# Patient Record
Sex: MALE | Race: WHITE | HISPANIC OR LATINO | Employment: STUDENT | URBAN - METROPOLITAN AREA
[De-identification: names, ages, dates, MRNs, and addresses within clinical notes are randomized per-mention and may not be internally consistent; named-entity substitution may affect disease eponyms.]

---
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
## 2019-01-25 ENCOUNTER — OFFICE VISIT (OUTPATIENT)
Dept: OBGYN CLINIC | Facility: CLINIC | Age: 19
End: 2019-01-25
Payer: COMMERCIAL

## 2019-01-25 VITALS
HEIGHT: 72 IN | BODY MASS INDEX: 30.53 KG/M2 | DIASTOLIC BLOOD PRESSURE: 76 MMHG | SYSTOLIC BLOOD PRESSURE: 124 MMHG | HEART RATE: 69 BPM | WEIGHT: 225.4 LBS

## 2019-01-25 DIAGNOSIS — Z98.890 STATUS POST LABRAL REPAIR OF SHOULDER: Primary | ICD-10-CM

## 2019-01-25 PROCEDURE — 99213 OFFICE O/P EST LOW 20 MIN: CPT | Performed by: ORTHOPAEDIC SURGERY

## 2019-01-25 NOTE — PROGRESS NOTES
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 [employer]  He is also cleared to return to gym at this time  He will continue home exercises for the shoulder and follow up as needed  Subjective:   Kristen Villalobos is a 22 y o  male who presents today for follow-up of his left shoulder, now about 5 months status post posterior labral repair  He is doing well and notes only some mild soreness about the shoulder when leaning on it or doing certain activities  He has been doing a fair amount of lifting with the shoulder, though he feels he cannot bench press heavy weight yet  He notes good range of motion of the shoulder and denies any instability  He was doing physical therapy up until about a week ago, and notes significant improvement with that  Review of Systems   Constitutional: Negative for chills, fever and unexpected weight change  HENT: Negative for hearing loss, nosebleeds and sore throat  Eyes: Negative for pain, redness and visual disturbance  Respiratory: Negative for cough, shortness of breath and wheezing  Cardiovascular: Negative for chest pain, palpitations and leg swelling  Gastrointestinal: Negative for abdominal pain, nausea and vomiting  Endocrine: Negative for polyphagia and polyuria  Genitourinary: Negative for dysuria and hematuria  Musculoskeletal:        See HPI   Skin: Negative for rash and wound  Neurological: Negative for dizziness, numbness and headaches  Psychiatric/Behavioral: Negative for decreased concentration and suicidal ideas   The patient is not nervous/anxious  Past Medical History:   Diagnosis Date    Anesthesia complication     Sister had difficulty waking up    Fracture     of radius and ulna-reduced with out surgery-still bothers him    Shoulder pain, left     Wears glasses        Past Surgical History:   Procedure Laterality Date    NO PAST SURGERIES      [place] SHLDR ARTHROSCOP,SURG,CAPSULORRHAPHY Left 8/23/2018    Procedure: SHOULDER ARTHROSCOPY POSTERIOR LABRAL REPAIR;  Surgeon: Parish Phoenix MD;  Location: [facility] OR;  Service: Orthopedics       Family History   Problem Relation Age of Onset    No Known Problems Mother     No Known Problems Father     No Known Problems Sister     No Known Problems Brother     No Known Problems Maternal Aunt     No Known Problems Maternal Uncle     No Known Problems Paternal Aunt     No Known Problems Paternal Uncle     No Known Problems Maternal Grandmother     No Known Problems Maternal Grandfather     No Known Problems Paternal Grandmother     No Known Problems Paternal Grandfather     ADD / ADHD Neg Hx     Anesthesia problems Neg Hx     Cancer Neg Hx     Clotting disorder Neg Hx     Collagen disease Neg Hx     Diabetes Neg Hx     Dislocations Neg Hx     Learning disabilities Neg Hx     Neurological problems Neg Hx     Osteoporosis Neg Hx     Rheumatologic disease Neg Hx     Scoliosis Neg Hx     Vascular Disease Neg Hx        Social History     Occupational History    Not on file       Social History Main Topics    Smoking status: Current Some Day Smoker     Types: E-Cigarettes    Smokeless tobacco: Never Used      Comment: social smoker    Alcohol use No    Drug use: No    Sexual activity: Yes     Partners: Female         Current Outpatient Prescriptions:     Melatonin 5 MG CAPS, Take 5 mg by mouth as needed, Disp: , Rfl:     No Known Allergies    Objective:  Vitals:    01/25/19 1436   BP: 124/76   Pulse: 69       Left Shoulder Exam     Tenderness   The patient is experiencing no tenderness  Range of Motion   Active Abduction: normal   Forward Flexion: normal   External Rotation: normal   Internal Rotation 0 degrees: normal     Muscle Strength   Abduction: 5/5   Internal Rotation: 5/5   External Rotation: 5/5     Tests   Apprehension: negative (Negative anterior and posterior)  Drop Arm: negative  Hawkin's test: negative  Impingement: negative    Other   Erythema: absent  Scars: present  Sensation: normal  Pulse: present             Physical Exam   Constitutional: He is oriented to person, place, and time  He appears well-developed  HENT:   Head: Normocephalic and atraumatic  Eyes: Conjunctivae are normal    Neck: Neck supple  Cardiovascular: Intact distal pulses  Pulmonary/Chest: Effort normal  No respiratory distress  Neurological: He is alert and oriented to person, place, and time  Skin: Skin is warm and dry  Psychiatric: He has a normal mood and affect  His behavior is normal    Vitals reviewed

## 2019-01-25 NOTE — LETTER
January 25, 2019     Patient: Devon Madera   YOB: 2000   Date of Visit: 1/25/2019       To Whom it May Concern:    Kalli Rao is under my professional care  He was seen in my office on 1/25/2019  He has recovered fully from his left shoulder surgery and is cleared for all [employer] duty  If you have any questions or concerns, please don't hesitate to call           Sincerely,          Keara Grayson MD        CC: No Recipients

## 2019-01-25 NOTE — LETTER
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

## 2019-01-25 NOTE — LETTER
January 25, 2019     Patient: Henrique Blevins   YOB: 2000   Date of Visit: 1/25/2019       To Whom it May Concern:    Raudel Jama is under my professional care  He was seen in my office on 1/25/2019  Please excuse him from school today  He is cleared for gym and sports at this time  If you have any questions or concerns, please don't hesitate to call           Sincerely,          Basilio Mcnair MD        CC: No Recipients

## 2019-03-29 ENCOUNTER — APPOINTMENT (OUTPATIENT)
Dept: RADIOLOGY | Facility: CLINIC | Age: 19
End: 2019-03-29
Payer: COMMERCIAL

## 2019-03-29 ENCOUNTER — OFFICE VISIT (OUTPATIENT)
Dept: OBGYN CLINIC | Facility: CLINIC | Age: 19
End: 2019-03-29
Payer: COMMERCIAL

## 2019-03-29 VITALS
HEIGHT: 72 IN | HEART RATE: 56 BPM | DIASTOLIC BLOOD PRESSURE: 63 MMHG | SYSTOLIC BLOOD PRESSURE: 128 MMHG | BODY MASS INDEX: 30.15 KG/M2 | WEIGHT: 222.6 LBS

## 2019-03-29 DIAGNOSIS — S43.402A SPRAIN OF LEFT SHOULDER, UNSPECIFIED SHOULDER SPRAIN TYPE, INITIAL ENCOUNTER: Primary | ICD-10-CM

## 2019-03-29 DIAGNOSIS — M25.512 ACUTE PAIN OF LEFT SHOULDER: ICD-10-CM

## 2019-03-29 DIAGNOSIS — S29.011A STRAIN OF LEFT PECTORALIS MUSCLE, INITIAL ENCOUNTER: ICD-10-CM

## 2019-03-29 PROCEDURE — 73030 X-RAY EXAM OF SHOULDER: CPT

## 2019-03-29 PROCEDURE — 99214 OFFICE O/P EST MOD 30 MIN: CPT | Performed by: ORTHOPAEDIC SURGERY

## 2019-03-29 NOTE — PROGRESS NOTES
Assessment/Plan:  1  Sprain of left shoulder, unspecified shoulder sprain type, initial encounter  Ambulatory referral to Physical Therapy   2  Acute pain of left shoulder  XR shoulder 2+ vw left   3  Strain of left pectoralis muscle, initial encounter  Ambulatory referral to Physical Therapy       Scribe Attestation    I,:   Patrick Alonzost am acting as a scribe while in the presence of the attending physician :        I,:   Abi Selby MD personally performed the services described in this documentation    as scribed in my presence :              Shaila Escobedo upon examination and review of x-rays today of the left shoulder demonstrates signs and symptoms consistent with a left pectoralis muscular strain as well as a left shoulder sprain  He does demonstrate full range of motion reasonable strength with rotator cuff testing  His examination is not consistent with a recurrent injury to the posterior labrum  However I did note to him that there is a possibility that he has an injury to the superior labrum with a positive apprehension test   I would like to treat him conservatively prior to undergoing an MRI with physical therapy  I provided him a prescription physical therapy today to be completed 2 to 3 times a week over the next 6 weeks  I do feel that he may return to work however I provided with a note dictating that he is to be on light duty over the next 2 weeks  Additionally provided him a note for school today  I would like to see John Humphrey back in 6 weeks for repeat clinical evaluation should he fail to see improvements at that time we may consider an MRI of the left shoulder to question internal derangement  Subjective:   Radha Allen is a 22 y o  male who presents to the office today for his left shoulder  He is 7 months status post posterior labral repair  He states that he suffered a fall while in gym class on Wednesday and braced his weight with his left upper extremity    He states that he felt a pop at the time of fall  He notes that his pain is an intermittent and mild to moderate sharp pain about the superior posterior aspect of his shoulder  He states that his pain is exacerbated with reaching up behind his head  He notes that his range of motion is overall intact however is uncomfortable with overhead activities  This pain is better while at rest   He denies experiencing any paresthesias the time of injury, and he denies any distal paresthesias today  Review of Systems   Constitutional: Negative for chills, fever and unexpected weight change  HENT: Negative for hearing loss, nosebleeds and sore throat  Eyes: Negative for pain, redness and visual disturbance  Respiratory: Negative for cough, shortness of breath and wheezing  Cardiovascular: Negative for chest pain, palpitations and leg swelling  Gastrointestinal: Negative for abdominal pain, nausea and vomiting  Endocrine: Negative for polyphagia and polyuria  Genitourinary: Negative for dysuria and hematuria  Musculoskeletal: Positive for arthralgias and myalgias  See HPI   Skin: Negative for rash and wound  Neurological: Negative for dizziness, numbness and headaches  Psychiatric/Behavioral: Negative for decreased concentration and suicidal ideas  The patient is not nervous/anxious            Past Medical History:   Diagnosis Date    Anesthesia complication     Sister had difficulty waking up    Fracture     of radius and ulna-reduced with out surgery-still bothers him    Shoulder pain, left     Wears glasses        Past Surgical History:   Procedure Laterality Date    NO PAST SURGERIES      [place] SHLDR ARTHROSCOP,SURG,CAPSULORRHAPHY Left 8/23/2018    Procedure: SHOULDER ARTHROSCOPY POSTERIOR LABRAL REPAIR;  Surgeon: Lian Morton MD;  Location: [facility];  Service: Orthopedics       Family History   Problem Relation Age of Onset    No Known Problems Mother     No Known Problems Father     No Known Problems Sister     No Known Problems Brother     No Known Problems Maternal Aunt     No Known Problems Maternal Uncle     No Known Problems Paternal Aunt     No Known Problems Paternal Uncle     No Known Problems Maternal Grandmother     No Known Problems Maternal Grandfather     No Known Problems Paternal Grandmother     No Known Problems Paternal Grandfather     ADD / ADHD Neg Hx     Anesthesia problems Neg Hx     Cancer Neg Hx     Clotting disorder Neg Hx     Collagen disease Neg Hx     Diabetes Neg Hx     Dislocations Neg Hx     Learning disabilities Neg Hx     Neurological problems Neg Hx     Osteoporosis Neg Hx     Rheumatologic disease Neg Hx     Scoliosis Neg Hx     Vascular Disease Neg Hx        Social History     Occupational History    Not on file   Tobacco Use    Smoking status: Current Some Day Smoker     Types: E-Cigarettes    Smokeless tobacco: Never Used    Tobacco comment: social smoker   Substance and Sexual Activity    Alcohol use: No    Drug use: No    Sexual activity: Yes     Partners: Female         Current Outpatient Medications:     Melatonin 5 MG CAPS, Take 5 mg by mouth as needed, Disp: , Rfl:     No Known Allergies    Objective:  Vitals:    03/29/19 0759   BP: 128/63   Pulse: 56       Left Shoulder Exam     Tenderness   Left shoulder tenderness location: Posterior aspect of the shoulder, upper trapezius, levator scapula, pectoralis musculature      Range of Motion   Active abduction: 160   Passive abduction: 160   External rotation: 60   Forward flexion: 170   Internal rotation 0 degrees: L2     Muscle Strength   Abduction: 5/5   Internal rotation: 5/5   External rotation: 5/5   Biceps: 4/5     Other   Erythema: absent  Scars: present  Sensation: normal  Pulse: present     Comments:  Empty can test:  Negative  Helenville's test:  Positive  Anterior apprehension test:  Positive  Posterior apprehension test:  Negative  Scottie Lupton:  Negative  Neer's impingement:  Negative  Speed's test:  Positive  Pectoralis tendon is palpable on exam             Physical Exam   Constitutional: He is oriented to person, place, and time  He appears well-developed and well-nourished  HENT:   Head: Normocephalic and atraumatic  Eyes: Conjunctivae are normal  Right eye exhibits no discharge  Left eye exhibits no discharge  Neck: Normal range of motion  Neck supple  Cardiovascular: Normal rate and intact distal pulses  Pulmonary/Chest: Effort normal  No respiratory distress  Musculoskeletal:   As noted in HPI   Neurological: He is alert and oriented to person, place, and time  Skin: Skin is warm and dry  Psychiatric: He has a normal mood and affect  His behavior is normal  Judgment and thought content normal    Nursing note and vitals reviewed  I have personally reviewed pertinent films in PACS and my interpretation is as follows:    X-ray of the left shoulder demonstrates no acute fracture or other osseous abnormalities  There is evidence of the anchor tracts from previous posterior labral repair  The humerus is well aligned to the glenoid

## 2019-03-29 NOTE — LETTER
March 29, 2019     Patient: lAden Mcpherson   YOB: 2000   Date of Visit: 3/29/2019       To Whom it May Concern:    Omega Raleigh is under my professional care  He was seen in my office on 3/29/2019  He may return to school on 3/29/2019  May return to work at [employer] duty over the next 2 weeks       If you have any questions or concerns, please don't hesitate to call           Sincerely,          Ramonita Aguilar MD        CC: No Recipients

## 2019-03-29 NOTE — PROGRESS NOTES
Assessment/Plan:  1   Acute pain of left shoulder  XR shoulder 2+ vw left       Scribe Attestation    I,:    am acting as a scribe while in the presence of the attending physician :        I,:    personally performed the services described in this documentation    as scribed in my presence :              ***    Subjective:   Katelynn Ornelas is a 22 y o  male who presents to the office today for ***      Review of Systems      Past Medical History:   Diagnosis Date    Anesthesia complication     Sister had difficulty waking up    Fracture     of radius and ulna-reduced with out surgery-still bothers him    Shoulder pain, left     Wears glasses        Past Surgical History:   Procedure Laterality Date    NO PAST SURGERIES      [place] SHLDR ARTHROSCOP,SURG,CAPSULORRHAPHY Left 8/23/2018    Procedure: SHOULDER ARTHROSCOPY POSTERIOR LABRAL REPAIR;  Surgeon: Reymundo Byrne MD;  Location: [facility];  Service: Orthopedics       Family History   Problem Relation Age of Onset    No Known Problems Mother     No Known Problems Father     No Known Problems Sister     No Known Problems Brother     No Known Problems Maternal Aunt     No Known Problems Maternal Uncle     No Known Problems Paternal Aunt     No Known Problems Paternal Uncle     No Known Problems Maternal Grandmother     No Known Problems Maternal Grandfather     No Known Problems Paternal Grandmother     No Known Problems Paternal Grandfather     ADD / ADHD Neg Hx     Anesthesia problems Neg Hx     Cancer Neg Hx     Clotting disorder Neg Hx     Collagen disease Neg Hx     Diabetes Neg Hx     Dislocations Neg Hx     Learning disabilities Neg Hx     Neurological problems Neg Hx     Osteoporosis Neg Hx     Rheumatologic disease Neg Hx     Scoliosis Neg Hx     Vascular Disease Neg Hx        Social History     Occupational History    Not on file   Tobacco Use    Smoking status: Current Some Day Smoker     Types: E-Cigarettes    Smokeless tobacco: Never Used    Tobacco comment: social smoker   Substance and Sexual Activity    Alcohol use: No    Drug use: No    Sexual activity: Yes     Partners: Female         Current Outpatient Medications:     Melatonin 5 MG CAPS, Take 5 mg by mouth as needed, Disp: , Rfl:     No Known Allergies    Objective:  Vitals:    03/29/19 0759   BP: 128/63   Pulse: 56       Ortho Exam    Physical Exam    I have personally reviewed pertinent films in PACS and my interpretation is as follows:  ***

## 2019-04-25 ENCOUNTER — OFFICE VISIT (OUTPATIENT)
Dept: OBGYN CLINIC | Facility: CLINIC | Age: 19
End: 2019-04-25
Payer: COMMERCIAL

## 2019-04-25 VITALS
BODY MASS INDEX: 29.69 KG/M2 | DIASTOLIC BLOOD PRESSURE: 75 MMHG | HEART RATE: 61 BPM | WEIGHT: 219.2 LBS | HEIGHT: 72 IN | SYSTOLIC BLOOD PRESSURE: 109 MMHG

## 2019-04-25 DIAGNOSIS — S29.011A STRAIN OF LEFT PECTORALIS MUSCLE, INITIAL ENCOUNTER: Primary | ICD-10-CM

## 2019-04-25 PROCEDURE — 99213 OFFICE O/P EST LOW 20 MIN: CPT | Performed by: PHYSICIAN ASSISTANT

## 2020-02-07 ENCOUNTER — OFFICE VISIT (OUTPATIENT)
Dept: OBGYN CLINIC | Facility: CLINIC | Age: 20
End: 2020-02-07
Payer: COMMERCIAL

## 2020-02-07 VITALS
HEIGHT: 72 IN | DIASTOLIC BLOOD PRESSURE: 79 MMHG | BODY MASS INDEX: 29.55 KG/M2 | WEIGHT: 218.2 LBS | SYSTOLIC BLOOD PRESSURE: 112 MMHG | HEART RATE: 75 BPM

## 2020-02-07 DIAGNOSIS — S43.432D LABRAL TEAR OF SHOULDER, LEFT, SUBSEQUENT ENCOUNTER: Primary | ICD-10-CM

## 2020-02-07 DIAGNOSIS — Z98.890 STATUS POST LABRAL REPAIR OF SHOULDER: ICD-10-CM

## 2020-02-07 PROCEDURE — 99214 OFFICE O/P EST MOD 30 MIN: CPT | Performed by: ORTHOPAEDIC SURGERY

## 2020-02-07 RX ORDER — ACETAMINOPHEN 325 MG/1
650 TABLET ORAL EVERY 6 HOURS PRN
COMMUNITY

## 2020-02-07 NOTE — PROGRESS NOTES
Assessment/Plan:  1  Labral tear of shoulder, left, subsequent encounter  MRI arthrogram left shoulder    FL injection left shoulder (arthrogram)   2  Status post labral repair of shoulder  MRI arthrogram left shoulder    FL injection left shoulder (arthrogram)       Scribe Attestation    I,:   Mckenzie Georges MA am acting as a scribe while in the presence of the attending physician :        I,:   Paulina Henry MD personally performed the services described in this documentation    as scribed in my presence :            Trey and I engaged in conversation in regards to his posterior shoulder pain  I am concerned because he continues to have pain while doing push-ups that he potentially has a recurrent posterior labral tear  I did order a left shoulder MRI arthrogram for further evaluation of this  Patient will return after MRI to review the imaging and discuss treatment options  Subjective:   Ene Robbins is a 20 y o  male who presents to the office today for evaluation of his left shoulder  Patient was last seen in April 2019 where he had sustained a pectoralis strain  Patient notes today that he does not have anterior shoulder pain but posterior pain  Does have a past medical history of a posterior labral repair in August of 2018  Patient notes no new incident of injury  He did try to join the [employer] recently and was not able forearm pushups without posterior shoulder pain and fatigue  Patient also notes significant pain while lifting objects overhead  His pain is activity related in nature  He denies any numbness and tingling  He has done physical therapy in the past in regards to his pec strain  Review of Systems   Constitutional: Negative for chills, fever and unexpected weight change  HENT: Negative for hearing loss, nosebleeds and sore throat  Eyes: Negative for pain, redness and visual disturbance  Respiratory: Negative for cough, shortness of breath and wheezing  Cardiovascular: Negative for chest pain, palpitations and leg swelling  Gastrointestinal: Negative for abdominal pain, nausea and vomiting  Endocrine: Negative for polydipsia and polyuria  Genitourinary: Negative for dysuria and hematuria  Musculoskeletal: Positive for arthralgias and myalgias  Negative for joint swelling  Skin: Negative for rash and wound  Neurological: Negative for dizziness, numbness and headaches  Psychiatric/Behavioral: Negative for agitation, decreased concentration and suicidal ideas           Past Medical History:   Diagnosis Date    Anesthesia complication     Sister had difficulty waking up    Fracture     of radius and ulna-reduced with out surgery-still bothers him    Shoulder pain, left     Wears glasses        Past Surgical History:   Procedure Laterality Date    NO PAST SURGERIES      [place] SHLDR ARTHROSCOP,SURG,CAPSULORRHAPHY Left 8/23/2018    Procedure: SHOULDER ARTHROSCOPY POSTERIOR LABRAL REPAIR;  Surgeon: Danni Syed MD;  [facility];  Service: Orthopedics       Family History   Problem Relation Age of Onset    No Known Problems Mother     No Known Problems Father     No Known Problems Sister     No Known Problems Brother     No Known Problems Maternal Aunt     No Known Problems Maternal Uncle     No Known Problems Paternal Aunt     No Known Problems Paternal Uncle     No Known Problems Maternal Grandmother     No Known Problems Maternal Grandfather     No Known Problems Paternal Grandmother     No Known Problems Paternal Grandfather     ADD / ADHD Neg Hx     Anesthesia problems Neg Hx     Cancer Neg Hx     Clotting disorder Neg Hx     Collagen disease Neg Hx     Diabetes Neg Hx     Dislocations Neg Hx     Learning disabilities Neg Hx     Neurological problems Neg Hx     Osteoporosis Neg Hx     Rheumatologic disease Neg Hx     Scoliosis Neg Hx     Vascular Disease Neg Hx        Social History     Occupational History    Not on file   Tobacco Use    Smoking status: Current Some Day Smoker     Types: E-Cigarettes    Smokeless tobacco: Never Used    Tobacco comment: social smoker   Substance and Sexual Activity    Alcohol use: No    Drug use: No    Sexual activity: Yes     Partners: Female         Current Outpatient Medications:     acetaminophen (TYLENOL) 325 mg tablet, Take 650 mg by mouth every 6 (six) hours as needed for mild pain, Disp: , Rfl:     No Known Allergies    Objective:  Vitals:    02/07/20 0854   BP: 112/79   Pulse: 75       Left Shoulder Exam     Range of Motion   The patient has normal left shoulder ROM  Other   Scars: present  Sensation: normal  Pulse: present     Comments:  Negative jerk maneuver  Well-healed incisions with evidence of slight keloid formation  pain with resisted protraction of the shoulder the posterior aspect            Physical Exam   Constitutional: He is oriented to person, place, and time  He appears well-developed  HENT:   Head: Normocephalic and atraumatic  Eyes: Conjunctivae are normal    Neck: Neck supple  Cardiovascular: Intact distal pulses  Pulmonary/Chest: Effort normal    Neurological: He is alert and oriented to person, place, and time  Skin: Skin is warm and dry  Psychiatric: He has a normal mood and affect  His behavior is normal    Vitals reviewed  I have personally reviewed pertinent films in PACS and my interpretation is as follows:   No new images to review

## 2020-02-25 ENCOUNTER — HOSPITAL ENCOUNTER (OUTPATIENT)
Dept: RADIOLOGY | Facility: HOSPITAL | Age: 20
Discharge: HOME | End: 2020-02-25
Attending: ORTHOPAEDIC SURGERY
Payer: COMMERCIAL

## 2020-02-25 DIAGNOSIS — Z98.890 STATUS POST LABRAL REPAIR OF SHOULDER: ICD-10-CM

## 2020-02-25 DIAGNOSIS — S43.432D LABRAL TEAR OF SHOULDER, LEFT, SUBSEQUENT ENCOUNTER: ICD-10-CM

## 2020-02-25 PROCEDURE — 77002 NEEDLE LOCALIZATION BY XRAY: CPT

## 2020-02-25 PROCEDURE — 23350 INJECTION FOR SHOULDER X-RAY: CPT

## 2020-02-25 PROCEDURE — 73222 MRI JOINT UPR EXTREM W/DYE: CPT

## 2020-02-25 PROCEDURE — A9585 GADOBUTROL INJECTION: HCPCS | Performed by: ORTHOPAEDIC SURGERY

## 2020-02-25 RX ORDER — LIDOCAINE HYDROCHLORIDE 10 MG/ML
5 INJECTION, SOLUTION EPIDURAL; INFILTRATION; INTRACAUDAL; PERINEURAL
Status: COMPLETED | OUTPATIENT
Start: 2020-02-25 | End: 2020-02-25

## 2020-02-25 RX ADMIN — GADOBUTROL 2 ML: 604.72 INJECTION INTRAVENOUS at 15:11

## 2020-02-25 RX ADMIN — IOHEXOL 2 ML: 240 INJECTION, SOLUTION INTRATHECAL; INTRAVASCULAR; INTRAVENOUS; ORAL at 14:45

## 2020-02-25 RX ADMIN — LIDOCAINE HYDROCHLORIDE 5 ML: 10 INJECTION, SOLUTION EPIDURAL; INFILTRATION; INTRACAUDAL; PERINEURAL at 14:44

## 2020-02-26 ENCOUNTER — TELEPHONE (OUTPATIENT)
Dept: OBGYN CLINIC | Facility: CLINIC | Age: 20
End: 2020-02-26

## 2020-02-26 NOTE — TELEPHONE ENCOUNTER
Advised fu scheduled   ----- Message from Jordon Paul PA-C sent at 2/26/2020 11:44 AM EST -----  Possible small recurrent labral tear   FU in office to discuss  ----- Message -----  From: Interface, Radiology Results In  Sent: 2/26/2020  11:04 AM EST  To: Lady Kateryna MD

## 2021-07-13 ENCOUNTER — OFFICE VISIT (OUTPATIENT)
Dept: OBGYN CLINIC | Facility: CLINIC | Age: 21
End: 2021-07-13
Payer: COMMERCIAL

## 2021-07-13 VITALS
HEART RATE: 55 BPM | SYSTOLIC BLOOD PRESSURE: 104 MMHG | BODY MASS INDEX: 28.44 KG/M2 | HEIGHT: 72 IN | DIASTOLIC BLOOD PRESSURE: 67 MMHG | WEIGHT: 210 LBS

## 2021-07-13 DIAGNOSIS — Z98.890 STATUS POST LABRAL REPAIR OF SHOULDER: Primary | ICD-10-CM

## 2021-07-13 PROCEDURE — 99212 OFFICE O/P EST SF 10 MIN: CPT | Performed by: ORTHOPAEDIC SURGERY

## 2021-07-13 NOTE — PROGRESS NOTES
Assessment/Plan:  1  Status post labral repair of shoulder         Scribe Attestation    I,:  Arvil Handler Call am acting as a scribe while in the presence of the attending physician :       I,:  Olvin Ornelas MD personally performed the services described in this documentation    as scribed in my presence :             Dany Hernandez has a normal left shoulder exam today  He has full range of motion and strength about the shoulder  In my medical opinion he has made a full recovery and is physically fit to participate in [employer] training and duty  I did provide him a note today stating such  Subjective:   Vangie Forrest is a 23 y o  male who presents to the office today for evaluation of his left shoulder  Dany Hernandez is entering the [address] [place] [address] Road is requesting my evaluation  He does have history of a left shoulder posterior labral repair performed by me on August 23, 2018  Today Dany Hernandez does not have any complaints about the left shoulder  He states he is functioning normally during his day  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  He states he has no difficulty performing those duties  He denies radicular symptoms or distal paresthesias  Review of Systems   Constitutional: Negative for chills, fever and unexpected weight change  HENT: Negative for hearing loss, nosebleeds and sore throat  Eyes: Negative for pain, redness and visual disturbance  Respiratory: Negative for cough, shortness of breath and wheezing  Cardiovascular: Negative for chest pain, palpitations and leg swelling  Gastrointestinal: Negative for abdominal pain, nausea and vomiting  Endocrine: Negative for polyphagia and polyuria  Genitourinary: Negative for dysuria and hematuria  Musculoskeletal:        See HPI   Skin: Negative for rash and wound  Neurological: Negative for dizziness, numbness and headaches     Psychiatric/Behavioral: Negative for decreased concentration and suicidal ideas  The patient is not nervous/anxious            Past Medical History:   Diagnosis Date    Anesthesia complication     Sister had difficulty waking up    Fracture     of radius and ulna-reduced with out surgery-still bothers him    Shoulder pain, left     Wears glasses        Past Surgical History:   Procedure Laterality Date    [place] INJECTION LEFT SHOULDER (ARTHROGRAM)  2/25/2020    NO PAST SURGERIES      [place] SHLDR ARTHROSCOP,SURG,CAPSULORRHAPHY Left 8/23/2018    Procedure: SHOULDER ARTHROSCOPY POSTERIOR LABRAL REPAIR;  Surgeon: Ritu Ramos MD;  Location: [facility];  Service: Orthopedics       Family History   Problem Relation Age of Onset    No Known Problems Mother     No Known Problems Father     No Known Problems Sister     No Known Problems Brother     No Known Problems Maternal Aunt     No Known Problems Maternal Uncle     No Known Problems Paternal Aunt     No Known Problems Paternal Uncle     No Known Problems Maternal Grandmother     No Known Problems Maternal Grandfather     No Known Problems Paternal Grandmother     No Known Problems Paternal Grandfather     ADD / ADHD Neg Hx     Anesthesia problems Neg Hx     Cancer Neg Hx     Clotting disorder Neg Hx     Collagen disease Neg Hx     Diabetes Neg Hx     Dislocations Neg Hx     Learning disabilities Neg Hx     Neurological problems Neg Hx     Osteoporosis Neg Hx     Rheumatologic disease Neg Hx     Scoliosis Neg Hx     Vascular Disease Neg Hx        Social History     Occupational History    Not on file   Tobacco Use    Smoking status: Current Some Day Smoker     Types: E-Cigarettes    Smokeless tobacco: Never Used    Tobacco comment: social smoker   Substance and Sexual Activity    Alcohol use: No    Drug use: No    Sexual activity: Yes     Partners: Female         Current Outpatient Medications:     acetaminophen (TYLENOL) 325 mg tablet, Take 650 mg by mouth every 6 (six) hours as needed for mild pain (Patient not taking: Reported on 7/13/2021), Disp: , Rfl:     No Known Allergies    Objective:  Vitals:    07/13/21 1214   BP: 104/67   Pulse: 55       Left Shoulder Exam   Left shoulder exam is normal     Tenderness   The patient is experiencing no tenderness  Range of Motion   The patient has normal left shoulder ROM  Active abduction: 170   External rotation: 90+   Forward flexion: 180+   Internal rotation 0 degrees: Mid thoracic   Internal rotation 90 degrees: 80     Muscle Strength   The patient has normal left shoulder strength  Abduction: 5/5   Internal rotation: 5/5   External rotation: 5/5   Supraspinatus: 5/5   Subscapularis: 5/5   Biceps: 5/5     Tests   Apprehension: negative  Impingement: negative  Sulcus: absent    Other   Sensation: normal  Pulse: present (2+ radial)     Comments:  Negative posterior apprehension  Negative posterior load and shift            Physical Exam  Vitals reviewed  Constitutional:       Appearance: He is well-developed  HENT:      Head: Normocephalic and atraumatic  Eyes:      General:         Right eye: No discharge  Left eye: No discharge  Conjunctiva/sclera: Conjunctivae normal    Cardiovascular:      Rate and Rhythm: Regular rhythm  Pulmonary:      Effort: Pulmonary effort is normal  No respiratory distress  Musculoskeletal:      Cervical back: Normal range of motion and neck supple  Skin:     General: Skin is warm and dry  Neurological:      Mental Status: He is alert and oriented to person, place, and time     Psychiatric:         Behavior: Behavior normal

## 2021-07-13 NOTE — LETTER
July 13, 2021     Patient: Ainsley Cruz   YOB: 2000   Date of Visit: 7/13/2021       To Whom it May Concern:    Sloane Serrano is under my professional care  He was seen in my office on 7/13/2021  He has a made a full recovery  He is cleared to participate in [facility] with no restrictions  If you have any questions or concerns, please don't hesitate to call           Sincerely,          Jacob Hyman MD        CC: Ainsley Cruz

## 2024-06-10 ENCOUNTER — APPOINTMENT (EMERGENCY)
Dept: RADIOLOGY | Facility: HOSPITAL | Age: 24
End: 2024-06-10
Payer: COMMERCIAL

## 2024-06-10 ENCOUNTER — HOSPITAL ENCOUNTER (OUTPATIENT)
Facility: HOSPITAL | Age: 24
Setting detail: OBSERVATION
Discharge: HOME | End: 2024-06-11
Attending: EMERGENCY MEDICINE | Admitting: INTERNAL MEDICINE
Payer: COMMERCIAL

## 2024-06-10 DIAGNOSIS — S67.21XA CRUSH INJURY OF HAND, RIGHT, INITIAL ENCOUNTER: Primary | ICD-10-CM

## 2024-06-10 DIAGNOSIS — S62.609A FINGER FRACTURE, RIGHT: ICD-10-CM

## 2024-06-10 LAB
ALBUMIN SERPL BCG-MCNC: 5.1 G/DL (ref 3.5–5)
ALP SERPL-CCNC: 48 U/L (ref 34–104)
ALT SERPL W P-5'-P-CCNC: 43 U/L (ref 7–52)
ANION GAP SERPL CALCULATED.3IONS-SCNC: 7 MMOL/L (ref 4–13)
AST SERPL W P-5'-P-CCNC: 36 U/L (ref 13–39)
BASOPHILS # BLD AUTO: 0.1 THOUSANDS/ÂΜL (ref 0–0.1)
BASOPHILS NFR BLD AUTO: 1 % (ref 0–1)
BILIRUB SERPL-MCNC: 0.4 MG/DL (ref 0.2–1)
BILIRUB UR QL STRIP: NEGATIVE
BUN SERPL-MCNC: 14 MG/DL (ref 5–25)
CALCIUM SERPL-MCNC: 9.8 MG/DL (ref 8.4–10.2)
CHLORIDE SERPL-SCNC: 105 MMOL/L (ref 96–108)
CK SERPL-CCNC: 710 U/L (ref 39–308)
CLARITY UR: CLEAR
CO2 SERPL-SCNC: 28 MMOL/L (ref 21–32)
COLOR UR: YELLOW
CREAT SERPL-MCNC: 1.1 MG/DL (ref 0.6–1.3)
EOSINOPHIL # BLD AUTO: 0.46 THOUSAND/ÂΜL (ref 0–0.61)
EOSINOPHIL NFR BLD AUTO: 6 % (ref 0–6)
ERYTHROCYTE [DISTWIDTH] IN BLOOD BY AUTOMATED COUNT: 11.9 % (ref 11.6–15.1)
GFR SERPL CREATININE-BSD FRML MDRD: 93 ML/MIN/1.73SQ M
GLUCOSE SERPL-MCNC: 93 MG/DL (ref 65–140)
GLUCOSE UR STRIP-MCNC: NEGATIVE MG/DL
HCT VFR BLD AUTO: 45.4 % (ref 36.5–49.3)
HGB BLD-MCNC: 15.6 G/DL (ref 12–17)
HGB UR QL STRIP.AUTO: NEGATIVE
IMM GRANULOCYTES # BLD AUTO: 0.09 THOUSAND/UL (ref 0–0.2)
IMM GRANULOCYTES NFR BLD AUTO: 1 % (ref 0–2)
KETONES UR STRIP-MCNC: NEGATIVE MG/DL
LEUKOCYTE ESTERASE UR QL STRIP: NEGATIVE
LYMPHOCYTES # BLD AUTO: 2.42 THOUSANDS/ÂΜL (ref 0.6–4.47)
LYMPHOCYTES NFR BLD AUTO: 31 % (ref 14–44)
MAGNESIUM SERPL-MCNC: 2.2 MG/DL (ref 1.9–2.7)
MCH RBC QN AUTO: 30.1 PG (ref 26.8–34.3)
MCHC RBC AUTO-ENTMCNC: 34.4 G/DL (ref 31.4–37.4)
MCV RBC AUTO: 88 FL (ref 82–98)
MONOCYTES # BLD AUTO: 0.57 THOUSAND/ÂΜL (ref 0.17–1.22)
MONOCYTES NFR BLD AUTO: 7 % (ref 4–12)
NEUTROPHILS # BLD AUTO: 4.19 THOUSANDS/ÂΜL (ref 1.85–7.62)
NEUTS SEG NFR BLD AUTO: 54 % (ref 43–75)
NITRITE UR QL STRIP: NEGATIVE
NRBC BLD AUTO-RTO: 0 /100 WBCS
PH UR STRIP.AUTO: 6.5 [PH]
PLATELET # BLD AUTO: 391 THOUSANDS/UL (ref 149–390)
PMV BLD AUTO: 10.1 FL (ref 8.9–12.7)
POTASSIUM SERPL-SCNC: 4.2 MMOL/L (ref 3.5–5.3)
PROT SERPL-MCNC: 7.8 G/DL (ref 6.4–8.4)
PROT UR STRIP-MCNC: ABNORMAL MG/DL
RBC # BLD AUTO: 5.19 MILLION/UL (ref 3.88–5.62)
SODIUM SERPL-SCNC: 140 MMOL/L (ref 135–147)
SP GR UR STRIP.AUTO: >=1.03 (ref 1–1.03)
UROBILINOGEN UR STRIP-ACNC: <2 MG/DL
WBC # BLD AUTO: 7.83 THOUSAND/UL (ref 4.31–10.16)

## 2024-06-10 PROCEDURE — 99283 EMERGENCY DEPT VISIT LOW MDM: CPT

## 2024-06-10 PROCEDURE — 80307 DRUG TEST PRSMV CHEM ANLYZR: CPT | Performed by: EMERGENCY MEDICINE

## 2024-06-10 PROCEDURE — 96361 HYDRATE IV INFUSION ADD-ON: CPT

## 2024-06-10 PROCEDURE — 96374 THER/PROPH/DIAG INJ IV PUSH: CPT

## 2024-06-10 PROCEDURE — 99285 EMERGENCY DEPT VISIT HI MDM: CPT | Performed by: EMERGENCY MEDICINE

## 2024-06-10 PROCEDURE — 85025 COMPLETE CBC W/AUTO DIFF WBC: CPT | Performed by: EMERGENCY MEDICINE

## 2024-06-10 PROCEDURE — 80053 COMPREHEN METABOLIC PANEL: CPT | Performed by: EMERGENCY MEDICINE

## 2024-06-10 PROCEDURE — 73090 X-RAY EXAM OF FOREARM: CPT

## 2024-06-10 PROCEDURE — 81001 URINALYSIS AUTO W/SCOPE: CPT | Performed by: EMERGENCY MEDICINE

## 2024-06-10 PROCEDURE — 82550 ASSAY OF CK (CPK): CPT

## 2024-06-10 PROCEDURE — 73110 X-RAY EXAM OF WRIST: CPT

## 2024-06-10 PROCEDURE — 73130 X-RAY EXAM OF HAND: CPT

## 2024-06-10 PROCEDURE — 99245 OFF/OP CONSLTJ NEW/EST HI 55: CPT | Performed by: STUDENT IN AN ORGANIZED HEALTH CARE EDUCATION/TRAINING PROGRAM

## 2024-06-10 PROCEDURE — 36415 COLL VENOUS BLD VENIPUNCTURE: CPT | Performed by: EMERGENCY MEDICINE

## 2024-06-10 PROCEDURE — 83735 ASSAY OF MAGNESIUM: CPT | Performed by: EMERGENCY MEDICINE

## 2024-06-10 RX ORDER — ACETAMINOPHEN 325 MG/1
975 TABLET ORAL EVERY 8 HOURS SCHEDULED
Status: DISCONTINUED | OUTPATIENT
Start: 2024-06-10 | End: 2024-06-10

## 2024-06-10 RX ORDER — HYDROMORPHONE HCL/PF 1 MG/ML
1 SYRINGE (ML) INJECTION ONCE
Status: COMPLETED | OUTPATIENT
Start: 2024-06-10 | End: 2024-06-10

## 2024-06-10 RX ORDER — KETOROLAC TROMETHAMINE 30 MG/ML
15 INJECTION, SOLUTION INTRAMUSCULAR; INTRAVENOUS EVERY 6 HOURS SCHEDULED
Status: DISCONTINUED | OUTPATIENT
Start: 2024-06-10 | End: 2024-06-10

## 2024-06-10 RX ORDER — PANTOPRAZOLE SODIUM 40 MG/1
40 TABLET, DELAYED RELEASE ORAL
Status: DISCONTINUED | OUTPATIENT
Start: 2024-06-11 | End: 2024-06-11 | Stop reason: HOSPADM

## 2024-06-10 RX ORDER — IBUPROFEN 600 MG/1
600 TABLET ORAL EVERY 6 HOURS SCHEDULED
Status: DISCONTINUED | OUTPATIENT
Start: 2024-06-12 | End: 2024-06-10

## 2024-06-10 RX ORDER — KETOROLAC TROMETHAMINE 30 MG/ML
15 INJECTION, SOLUTION INTRAMUSCULAR; INTRAVENOUS EVERY 6 HOURS SCHEDULED
Status: DISCONTINUED | OUTPATIENT
Start: 2024-06-10 | End: 2024-06-11 | Stop reason: HOSPADM

## 2024-06-10 RX ORDER — OXYCODONE HYDROCHLORIDE 5 MG/1
5 TABLET ORAL EVERY 4 HOURS PRN
Status: DISCONTINUED | OUTPATIENT
Start: 2024-06-10 | End: 2024-06-11 | Stop reason: HOSPADM

## 2024-06-10 RX ORDER — SODIUM CHLORIDE 9 MG/ML
125 INJECTION, SOLUTION INTRAVENOUS CONTINUOUS
Status: DISCONTINUED | OUTPATIENT
Start: 2024-06-10 | End: 2024-06-11 | Stop reason: HOSPADM

## 2024-06-10 RX ORDER — ACETAMINOPHEN 325 MG/1
975 TABLET ORAL EVERY 8 HOURS SCHEDULED
Status: DISCONTINUED | OUTPATIENT
Start: 2024-06-10 | End: 2024-06-11 | Stop reason: HOSPADM

## 2024-06-10 RX ADMIN — SODIUM CHLORIDE 125 ML/HR: 0.9 INJECTION, SOLUTION INTRAVENOUS at 21:00

## 2024-06-10 RX ADMIN — HYDROMORPHONE HYDROCHLORIDE 1 MG: 1 INJECTION, SOLUTION INTRAMUSCULAR; INTRAVENOUS; SUBCUTANEOUS at 16:58

## 2024-06-10 RX ADMIN — ACETAMINOPHEN 975 MG: 325 TABLET ORAL at 21:31

## 2024-06-10 RX ADMIN — KETOROLAC TROMETHAMINE 15 MG: 30 INJECTION, SOLUTION INTRAMUSCULAR; INTRAVENOUS at 19:15

## 2024-06-10 RX ADMIN — Medication 2.5 MG: at 23:28

## 2024-06-10 RX ADMIN — KETOROLAC TROMETHAMINE 15 MG: 30 INJECTION, SOLUTION INTRAMUSCULAR; INTRAVENOUS at 23:28

## 2024-06-10 RX ADMIN — SODIUM CHLORIDE 1000 ML: 0.9 INJECTION, SOLUTION INTRAVENOUS at 17:01

## 2024-06-10 RX ADMIN — MORPHINE SULFATE 2 MG: 2 INJECTION, SOLUTION INTRAMUSCULAR; INTRAVENOUS at 19:46

## 2024-06-10 NOTE — ASSESSMENT & PLAN NOTE
This is a chronic problem. Patient is s/p labral tear repair of his left shoulder in August 2018. Patient got injured while wrestling and had a posterior subluxation of the shoulder. MR arthrogram of the shoulder showed a linear tear within the posterior labrum, without detachment.      Patient follows with orthopedics Dr. Ramirez   He does not have any symptoms of tingling, numbness or restrictive ROM on his left arm

## 2024-06-10 NOTE — LETTER
[address] [address]  Dept: [phone]    June 11, 2024     Patient: Trey Barrientos   YOB: 2000   Date of Visit: 6/10/2024       To Whom it May Concern:    Trey Barrientos is under my professional care. He was seen in the hospital from 6/10/2024 to 06/11/24. He may return to work after follow-up with hand surgeon on Friday, 6/14/2024.    If you have any questions or concerns, please don't hesitate to call.         Sincerely,          Carroll Juarez MD

## 2024-06-10 NOTE — CONSULTS
Chief Complaint       Right hand crush injury       History of the Present Illness     Trey Barrientos is a 23 y.o. male who presented to the emergency room after he sustained a right hand crush injury at work.  He is unsure if this crush injury happened to his digits hand or forearm.  I was contacted by the emergency room for concern for compartment syndrome and pain that was not controlled with narcotics.  Patient reports most of his pain is to his distal middle and ring finger, reports no pain today palm or forearm.  Reports decreased sensation to the distal tip of the middle and ring however normal sensation to the other digits.              Past Medical History:   Diagnosis Date    Anesthesia complication     Sister had difficulty waking up    Fracture     of radius and ulna-reduced with out surgery-still bothers him    Shoulder pain, left     Wears glasses        Past Surgical History:   Procedure Laterality Date    FL INJECTION LEFT SHOULDER (ARTHROGRAM)  2/25/2020    NO PAST SURGERIES      [place] SURGICAL ARTHROSCOPY SHOULDER CAPSULORRHAPHY Left 8/23/2018    Procedure: SHOULDER ARTHROSCOPY POSTERIOR LABRAL REPAIR;  Surgeon: Stephan Ramirez MD;  Location: [facility];  Service: Orthopedics       No Known Allergies    No current facility-administered medications on file prior to encounter.     Current Outpatient Medications on File Prior to Encounter   Medication Sig Dispense Refill    [DISCONTINUED] acetaminophen (TYLENOL) 325 mg tablet Take 650 mg by mouth every 6 (six) hours as needed for mild pain (Patient not taking: Reported on 7/13/2021)         Social History     Tobacco Use    Smoking status: Some Days     Types: E-Cigarettes    Smokeless tobacco: Current     Types: Chew    Tobacco comments:     social smoker   Vaping Use    Vaping status: Never Used   Substance Use Topics    Alcohol use: No    Drug use: No       Family History   Problem Relation Age of Onset    No Known Problems Mother     No Known  Problems Father     No Known Problems Sister     No Known Problems Brother     No Known Problems Maternal Aunt     No Known Problems Maternal Uncle     No Known Problems Paternal Aunt     No Known Problems Paternal Uncle     No Known Problems Maternal Grandmother     No Known Problems Maternal Grandfather     No Known Problems Paternal Grandmother     No Known Problems Paternal Grandfather     ADD / ADHD Neg Hx     Anesthesia problems Neg Hx     Cancer Neg Hx     Clotting disorder Neg Hx     Collagen disease Neg Hx     Diabetes Neg Hx     Dislocations Neg Hx     Learning disabilities Neg Hx     Neurological problems Neg Hx     Osteoporosis Neg Hx     Rheumatologic disease Neg Hx     Scoliosis Neg Hx     Vascular Disease Neg Hx        Review of Systems     As stated in the HPI. All other systems were reviewed and are negative.      Physical Exam     /85   Pulse 60   Temp 99.1 °F (37.3 °C) (Tympanic)   Resp 20   Wt 109 kg (240 lb)   SpO2 95%   BMI 32.55 kg/m²     GENERAL: This is a well-developed, well-nourished, age-appropriate patient in no acute distress. The patient is alert and oriented x3. Pleasant and cooperative.  Eyes: Anicteric sclerae. Extraocular movements appear intact.  HENT: Nares are patent with no drainage.  Lungs: There is equal chest rise on inspection. Breathing is non-labored with no audible wheezing.  Cardiovascular: No cyanosis. No upper extremity lymphadema.  Skin: Skin is warm to touch. No obvious skin lesions or rashes other than described below.  Neurologic: No ataxia  Psychiatric: Mood and affect are appropriate.    Exam:  RUE  Compartments of forearm and hand are soft and has no pain with palpation of compartments  Wrist range of motion painless  Reports pain with finger flexion and extension to middle and ring finger  Swelling limited to distal tip of middle finger otherwise no swelling to hand or forearm  FDS and FDP intact to all digits  FPL and EPL intact  Sensation intact  to all digits however altered to the tip of the middle and ring finger per patient  Can extend each digit      Data Review     Results Reviewed       Procedure Component Value Units Date/Time    Comprehensive metabolic panel [[id number]]  (Abnormal) Collected: 06/10/24 1700    Lab Status: Final result Specimen: Blood from Arm, Left Updated: 06/10/24 1803     Sodium 140 mmol/L      Potassium 4.2 mmol/L      Chloride 105 mmol/L      CO2 28 mmol/L      ANION GAP 7 mmol/L      BUN 14 mg/dL      Creatinine 1.10 mg/dL      Glucose 93 mg/dL      Calcium 9.8 mg/dL      AST 36 U/L      ALT 43 U/L      Alkaline Phosphatase 48 U/L      Total Protein 7.8 g/dL      Albumin 5.1 g/dL      Total Bilirubin 0.40 mg/dL      eGFR 93 ml/min/1.73sq m     Narrative:      Specimen Lipemic.  National Kidney Disease Foundation guidelines for Chronic Kidney Disease (CKD):     Stage 1 with normal or high GFR (GFR > 90 mL/min/1.73 square meters)    Stage 2 Mild CKD (GFR = 60-89 mL/min/1.73 square meters)    Stage 3A Moderate CKD (GFR = 45-59 mL/min/1.73 square meters)    Stage 3B Moderate CKD (GFR = 30-44 mL/min/1.73 square meters)    Stage 4 Severe CKD (GFR = 15-29 mL/min/1.73 square meters)    Stage 5 End Stage CKD (GFR <15 mL/min/1.73 square meters)  Note: GFR calculation is accurate only with a steady state creatinine    Magnesium [[id number]]  (Normal) Collected: 06/10/24 1700    Lab Status: Final result Specimen: Blood from Arm, Left Updated: 06/10/24 1803     Magnesium 2.2 mg/dL     Narrative:      Specimen Lipemic.    CBC and differential [[id number]]  (Abnormal) Collected: 06/10/24 1700    Lab Status: Final result Specimen: Blood from Arm, Left Updated: 06/10/24 1706     WBC 7.83 Thousand/uL      RBC 5.19 Million/uL      Hemoglobin 15.6 g/dL      Hematocrit 45.4 %      MCV 88 fL      MCH 30.1 pg      MCHC 34.4 g/dL      RDW 11.9 %      MPV 10.1 fL      Platelets 391 Thousands/uL      nRBC 0 /100 WBCs      Segmented % 54 %      Immature  Grans % 1 %      Lymphocytes % 31 %      Monocytes % 7 %      Eosinophils Relative 6 %      Basophils Relative 1 %      Absolute Neutrophils 4.19 Thousands/µL      Absolute Immature Grans 0.09 Thousand/uL      Absolute Lymphocytes 2.42 Thousands/µL      Absolute Monocytes 0.57 Thousand/µL      Eosinophils Absolute 0.46 Thousand/µL      Basophils Absolute 0.10 Thousands/µL     UA w Reflex to Microscopic w Reflex to Culture [[id number]]     Lab Status: No result Specimen: Urine     Rapid drug screen, urine [[id number]]     Lab Status: No result Specimen: Urine                Imaging:  Right hand, wrist, forearm x-ray: Only bony injury seen is right middle finger tuft fracture        Assessment and Plan       23-year-old male right hand crush injury with right middle finger distal tuft fracture  -On exam during evaluation patient did not appear to be in pain however did report extreme pain to his middle and ring finger.  I had an extensive discussion with him about the symptoms of compartment syndrome.  On my exam I am not concerned for compartment syndrome but based on presentation per ER I would like him admitted to monitor him overnight.  If his pain or symptoms progress/Change I would like to be contacted immediately.  -Cont to monitor patient every 2-3 hours for progressing pain- Please contact me if symptoms progress  -Cont with pain control   -Splinted and elevated  -Keep NPO overnight

## 2024-06-10 NOTE — ASSESSMENT & PLAN NOTE
ED reporting right hand crush injury s/p lifting 250 lb beam. Right hand dominant - evaluated in ED by orthopedic surgery - decreased sensation in 3rd & 4th digit digit.  Splint completed by orthopedics in ED - See attached pictures in media.    ED course: 1 L NS bolus, 1mg Dilaudid  Lab work: CBC WNL, BMP WNL  Xray: fracture visible of the distal phalynx of middle finger  R wrist XR, R forearm: No acute osseous abnormality   CK: 710     Plan:   -Discharge to home with Tylenol and naproxen for pain control  -Follow up with orthopedic surgery   -Follow up with PCP

## 2024-06-10 NOTE — ED PROVIDER NOTES
History  Chief Complaint   Patient presents with    Crush Injury     Here with father, at home building a pole barn and was lifting a 250 lb beam with another person who dropped their end and beam fell onto patients right hand and wrist.       23-year-old male presents to the ED for evaluation of right hand after crush injury.  Patient is right-hand dominant.  He was slammed by two 250 pound block of wood to the right hand while working on a project.  Now he has severe pain to right second, third, fourth, and fifth digit with significantly decreased sensation.  Father brought patient to the ED immediately after injury.  Patient denies any other injuries.      History provided by:  Patient and parent      None       Past Medical History:   Diagnosis Date    Anesthesia complication     Sister had difficulty waking up    Fracture     of radius and ulna-reduced with out surgery-still bothers him    Shoulder pain, left     Wears glasses        Past Surgical History:   Procedure Laterality Date    FL INJECTION LEFT SHOULDER (ARTHROGRAM)  2/25/2020    NO PAST SURGERIES      [place] SURGICAL ARTHROSCOPY SHOULDER CAPSULORRHAPHY Left 8/23/2018    Procedure: SHOULDER ARTHROSCOPY POSTERIOR LABRAL REPAIR;  Surgeon: Stephan Ramirez MD;  Location: [facility];  Service: Orthopedics       Family History   Problem Relation Age of Onset    No Known Problems Mother     No Known Problems Father     No Known Problems Sister     No Known Problems Brother     No Known Problems Maternal Aunt     No Known Problems Maternal Uncle     No Known Problems Paternal Aunt     No Known Problems Paternal Uncle     No Known Problems Maternal Grandmother     No Known Problems Maternal Grandfather     No Known Problems Paternal Grandmother     No Known Problems Paternal Grandfather     ADD / ADHD Neg Hx     Anesthesia problems Neg Hx     Cancer Neg Hx     Clotting disorder Neg Hx     Collagen disease Neg Hx     Diabetes Neg Hx     Dislocations Neg Hx      Learning disabilities Neg Hx     Neurological problems Neg Hx     Osteoporosis Neg Hx     Rheumatologic disease Neg Hx     Scoliosis Neg Hx     Vascular Disease Neg Hx      I have reviewed and agree with the history as documented.    E-Cigarette/Vaping    E-Cigarette Use Never User      E-Cigarette/Vaping Substances     Social History     Tobacco Use    Smoking status: Some Days     Types: E-Cigarettes    Smokeless tobacco: Current     Types: Chew    Tobacco comments:     social smoker   Vaping Use    Vaping status: Never Used   Substance Use Topics    Alcohol use: No    Drug use: No       Review of Systems   Constitutional:  Negative for chills and fever.   HENT:  Negative for ear pain and sore throat.    Eyes:  Negative for pain and visual disturbance.   Respiratory:  Negative for cough and shortness of breath.    Cardiovascular:  Negative for chest pain and palpitations.   Gastrointestinal:  Negative for abdominal pain and vomiting.   Genitourinary:  Negative for dysuria and hematuria.   Musculoskeletal:  Positive for arthralgias. Negative for back pain.   Skin:  Negative for color change and rash.   Neurological:  Negative for seizures and syncope.   All other systems reviewed and are negative.      Physical Exam  Physical Exam  Vitals and nursing note reviewed.   Constitutional:       General: He is not in acute distress.     Appearance: He is well-developed.   HENT:      Head: Normocephalic and atraumatic.   Eyes:      Conjunctiva/sclera: Conjunctivae normal.   Cardiovascular:      Rate and Rhythm: Normal rate and regular rhythm.      Heart sounds: No murmur heard.  Pulmonary:      Effort: Pulmonary effort is normal. No respiratory distress.      Breath sounds: Normal breath sounds.   Abdominal:      Palpations: Abdomen is soft.      Tenderness: There is no abdominal tenderness.   Musculoskeletal:         General: No swelling.      Cervical back: Neck supple.      Comments: Radial and ulnar pulses present to  right wrist.  Ecchymosis noted to right second through fifth digit.  Severe tenderness noted to palpation.  Patient is not able to move these fingers.  Please see picture below for clarification   Skin:     General: Skin is warm and dry.      Capillary Refill: Capillary refill takes less than 2 seconds.   Neurological:      Mental Status: He is alert.   Psychiatric:         Mood and Affect: Mood normal.               Vital Signs  ED Triage Vitals   Temperature Pulse Respirations Blood Pressure SpO2   06/10/24 1619 06/10/24 1619 06/10/24 1619 06/10/24 1619 06/10/24 1619   99.1 °F (37.3 °C) 70 18 142/85 94 %      Temp Source Heart Rate Source Patient Position - Orthostatic VS BP Location FiO2 (%)   06/10/24 1619 06/10/24 1619 -- -- --   Tympanic Monitor         Pain Score       06/10/24 1617       10 - Worst Possible Pain           Vitals:    06/10/24 1619 06/10/24 1815   BP: 142/85    Pulse: 70 60         Visual Acuity      ED Medications  Medications   sodium chloride 0.9 % infusion (has no administration in time range)   morphine injection 2 mg (2 mg Intravenous Given 6/10/24 1946)   oxyCODONE (ROXICODONE) split tablet 2.5 mg (has no administration in time range)     Or   oxyCODONE (ROXICODONE) IR tablet 5 mg (has no administration in time range)   pantoprazole (PROTONIX) EC tablet 40 mg (has no administration in time range)   acetaminophen (TYLENOL) tablet 975 mg (has no administration in time range)   ketorolac (TORADOL) injection 15 mg (15 mg Intravenous Given 6/10/24 1915)   sodium chloride 0.9 % bolus 1,000 mL (0 mL Intravenous Stopped 6/10/24 1801)   HYDROmorphone (DILAUDID) injection 1 mg (1 mg Intravenous Given 6/10/24 1658)       Diagnostic Studies  Results Reviewed       Procedure Component Value Units Date/Time    CK [[id number]]  (Abnormal) Collected: 06/10/24 1700    Lab Status: Final result Specimen: Blood from Arm, Left Updated: 06/10/24 1939     Total  U/L     Narrative:      Specimen Lipemic.     Comprehensive metabolic panel [[id number]]  (Abnormal) Collected: 06/10/24 1700    Lab Status: Final result Specimen: Blood from Arm, Left Updated: 06/10/24 1803     Sodium 140 mmol/L      Potassium 4.2 mmol/L      Chloride 105 mmol/L      CO2 28 mmol/L      ANION GAP 7 mmol/L      BUN 14 mg/dL      Creatinine 1.10 mg/dL      Glucose 93 mg/dL      Calcium 9.8 mg/dL      AST 36 U/L      ALT 43 U/L      Alkaline Phosphatase 48 U/L      Total Protein 7.8 g/dL      Albumin 5.1 g/dL      Total Bilirubin 0.40 mg/dL      eGFR 93 ml/min/1.73sq m     Narrative:      Specimen Lipemic.  National Kidney Disease Foundation guidelines for Chronic Kidney Disease (CKD):     Stage 1 with normal or high GFR (GFR > 90 mL/min/1.73 square meters)    Stage 2 Mild CKD (GFR = 60-89 mL/min/1.73 square meters)    Stage 3A Moderate CKD (GFR = 45-59 mL/min/1.73 square meters)    Stage 3B Moderate CKD (GFR = 30-44 mL/min/1.73 square meters)    Stage 4 Severe CKD (GFR = 15-29 mL/min/1.73 square meters)    Stage 5 End Stage CKD (GFR <15 mL/min/1.73 square meters)  Note: GFR calculation is accurate only with a steady state creatinine    Magnesium [[id number]]  (Normal) Collected: 06/10/24 1700    Lab Status: Final result Specimen: Blood from Arm, Left Updated: 06/10/24 1803     Magnesium 2.2 mg/dL     Narrative:      Specimen Lipemic.    CBC and differential [[id number]]  (Abnormal) Collected: 06/10/24 1700    Lab Status: Final result Specimen: Blood from Arm, Left Updated: 06/10/24 1706     WBC 7.83 Thousand/uL      RBC 5.19 Million/uL      Hemoglobin 15.6 g/dL      Hematocrit 45.4 %      MCV 88 fL      MCH 30.1 pg      MCHC 34.4 g/dL      RDW 11.9 %      MPV 10.1 fL      Platelets 391 Thousands/uL      nRBC 0 /100 WBCs      Segmented % 54 %      Immature Grans % 1 %      Lymphocytes % 31 %      Monocytes % 7 %      Eosinophils Relative 6 %      Basophils Relative 1 %      Absolute Neutrophils 4.19 Thousands/µL      Absolute Immature Grans 0.09  Thousand/uL      Absolute Lymphocytes 2.42 Thousands/µL      Absolute Monocytes 0.57 Thousand/µL      Eosinophils Absolute 0.46 Thousand/µL      Basophils Absolute 0.10 Thousands/µL     UA w Reflex to Microscopic w Reflex to Culture [[id number]]     Lab Status: No result Specimen: Urine     Rapid drug screen, urine [[id number]]     Lab Status: No result Specimen: Urine                    XR hand 3+ views RIGHT   ED Interpretation by Teena Cobb DO (06/10 2000)   Distal phalanx fracture noted to right third digit.  Formal radiology reading pending.      XR forearm 2 views RIGHT    (Results Pending)   XR wrist 3+ views RIGHT    (Results Pending)              Procedures  Procedures         ED Course  ED Course as of 06/10/24 2001   Mon Andrey 10, 2024   1720 Case discussed with orthopedic hand surgeon on-call, Dr. Larson, who will come see patient at bedside.   1759 Patient was evaluated by orthopedic surgeon on-call, Dr. Larson, who examined patient and the risk of compartment syndrome is low based on her examination.  She recommends admit to medicine overnight for observation and pain control.                               SBIRT 22yo+      Flowsheet Row Most Recent Value   Initial Alcohol Screen: US AUDIT-C     1. How often do you have a drink containing alcohol? 0 Filed at: 06/10/2024 1620   Audit-C Score 0 Filed at: 06/10/2024 1620   PANKAJ: How many times in the past year have you...    Used an illegal drug or used a prescription medication for non-medical reasons? Never Filed at: 06/10/2024 1620                      Medical Decision Making  Obtain blood work, x-ray right upper extremity  Give pain medicine  Consult hand surgery    Patient evaluated by orthopedic hand surgery.  Patient has a distal phalanx fracture in right third digit.  Splint placed by orthopedic hand surgeon.  At this time symptoms for compartment syndrome is low however it is recommended for patient to be admitted for pain control and for  observation of any worsening symptoms.  At this time patient admitted to family medicine service.  Patient agrees with admission plans.    Amount and/or Complexity of Data Reviewed  Labs: ordered. Decision-making details documented in ED Course.  Radiology: ordered and independent interpretation performed. Decision-making details documented in ED Course.    Risk  Prescription drug management.  Decision regarding hospitalization.             Disposition  Final diagnoses:   Crush injury of hand, right, initial encounter   Finger fracture, right     Time reflects when diagnosis was documented in both MDM as applicable and the Disposition within this note       Time User Action Codes Description Comment    6/10/2024  6:15 PM Teena Cobb Add [S67.21XA] Crush injury of hand, right, initial encounter     6/10/2024  6:15 PM Teena Cobb Add [S62.609A] Finger fracture, right           ED Disposition       ED Disposition   Admit    Condition   Stable    Date/Time   Mon Andrey 10, 2024 1815    Comment   Case was discussed with Dr. Canales and the patient's admission status was agreed to be Admission Status: observation status to the service of Dr. Canales.               Follow-up Information    None         Patient's Medications   Discharge Prescriptions    No medications on file       No discharge procedures on file.    PDMP Review       None            ED Provider  Electronically Signed by             Teena Cobb DO  06/10/24 2001

## 2024-06-10 NOTE — H&P
"History and Physical - [facility]  Family [facility] Residency     Patient Information: Trey Barrientos 23 y.o. male MRN: [id number]  Unit/Bed#: 2 [address] Encounter: [id number]  Admitting Physician: Justin Canales MD   PCP: No primary care provider on file.  Date of Admission:  06/11/24     Assessment and Plan     * Crush injury of hand, right, initial encounter  Assessment & Plan  Patient came to the ER after having a crush injury of his right hand at home.  According to the patient, he was building a pole barn and was lifting a 250 lb beam with another person who dropped their end and the beam fell onto his right hand and wrist. Patient's right arm is slinged. He reports decreasing sensation of the middle finger but reports normal sensation in the 4th and 5th digit. Normal ROM of arm. Patient was able to lift his arm above head and reported \"burning\" of his fingers. Exam limited due to pain and sling. See attached pictures in media.    ED course: 1 L NS bolus, 1mg Dilaudid  Lab work: CBC WNL, BMP WNL  Xray: fracture visible of the distal phalynx of middle finger. Official read pending    Plan:  N.p.o.   IVF NS@100mL/hour  Consult placed to orthopedic, hand surgeon Dr. Neo Mccarty to monitor patient every 2-3 hours for progressing pain- Please contact me if symptoms progress  Cont with pain control   Splinted and elevated  Keep NPO overnight  Pain management : IV toradol every 6 hours scheduled, acetaminophen 975 every 4 hours scheduled, Oxy 2.5, severe Oxy 5, breakthrough morphine 2mg IV  Monitor Vital signs  Continue to monitor for signs of compartment syndrome  Check CK levels - 710  CBC CMP Mg in am    Labral tear of shoulder, left, subsequent encounter  Assessment & Plan  This is a chronic problem. Patient is s/p labral tear repair of his left shoulder in August 2018. Patient got injured while wrestling and had a posterior subluxation of the shoulder. MR arthrogram of the shoulder showed a " linear tear within the posterior labrum, without detachment.      Patient follows with orthopedics Dr. Ramirez   He does not have any symptoms of tingling, numbness or restrictive ROM on his left arm           Fluids: IV NS bolus and now on IVF infusion  Electrolyte repletion: Replete as needed.  Nutrition:        Diet Orders   (From admission, onward)                 Start     Ordered    06/11/24 0001  Diet NPO  Diet effective now        References:    Adult Nutrition Support Algorithm    RD Therapeutic Diet Order Protocol   Question Answer Comment   Diet Type NPO    RD to adjust diet per protocol? Yes        06/10/24 5030                 NPO  VTE Prophylaxis: VTE Score: 1 Low Risk (Score 0-2) - Encourage Ambulation.  Code Status: Level 1 - Full Code  Anticipated Length of Stay:  Patient will be admitted on an Observation basis with an anticipated length of stay of  lesss than 2 midnights.     Justification for Hospital Stay: crush injury, paincontrol  Total Time for Visit, including Counseling / Coordination of Care: 45 mins. Greater than 50% of this total time spent on direct patient counseling and coordination of care.  Patient Information Sharing: With the consent of Trey Barrientos, their loved ones (girlfriend) were notified today by inpatient team of the patient’s condition and current plan. All questions answered.     Chief Complaint:     Chief Complaint   Patient presents with    Crush Injury     Here with father, at home building a pole barn and was lifting a 250 lb beam with another person who dropped their end and beam fell onto patients right hand and wrist.        Subjective      History of Present Illness:     Trey Barrientos is a 23 y.o. male with no significant past medical history who presents with Injury to his right hand while lifting wooden blocks at home while building  a pole barn.  Patient was lifting a 250 lb wooden block with help of another person who accidentally dropped the block from  their end causing the block to fall onto the patient's right hand and wrist.  Patient is right-hand dominant and does not have any memory of the event.  He remembers screaming in pain with blackout, but does not report any fall, hitting his head, fainting, syncope or dizziness.  Patient denies any chest pain, shortness of breath, headache, abdominal pain, urinary complaints, back pain, fever or chills.  Patient works as a  in [place] and is accompanied by his girlfriend.  States that he quit smoking, drinks only occasionally, no use of illicit drugs only marijuana occasionally.    Patient was given IV NS bolus and Dilaudid 1 mg IV for pain control in the ER.  Consult was placed for hand surgeon who exmained the patient in the ED. Patient's hand was splinted and he was kept n.p.o.He is being admitted for pain control and monitoring of potential development of compartment syndrome.     Review of Systems:  Review of Systems   Constitutional:  Negative for chills and fever.   HENT:  Negative for ear pain and sore throat.    Eyes:  Negative for pain and visual disturbance.   Respiratory:  Negative for cough and shortness of breath.    Cardiovascular:  Negative for chest pain and palpitations.   Gastrointestinal:  Negative for abdominal pain and vomiting.   Genitourinary:  Negative for dysuria and hematuria.   Musculoskeletal:  Negative for arthralgias and back pain.   Skin:  Positive for wound (right hand, echymosis present). Negative for color change and rash.   Allergic/Immunologic: Positive for environmental allergies.   Neurological:  Negative for seizures, syncope, weakness, light-headedness, numbness and headaches.   Psychiatric/Behavioral:  Negative for confusion, decreased concentration, dysphoric mood and sleep disturbance.    All other systems reviewed and are negative.       Past Medical History:   Diagnosis Date    Anesthesia complication     Sister had difficulty waking up    Fracture     of  radius and ulna-reduced with out surgery-still bothers him    Shoulder pain, left     Wears glasses      Past Surgical History:   Procedure Laterality Date    FL INJECTION LEFT SHOULDER (ARTHROGRAM)  2/25/2020    NO PAST SURGERIES      [place] SURGICAL ARTHROSCOPY SHOULDER CAPSULORRHAPHY Left 8/23/2018    Procedure: SHOULDER ARTHROSCOPY POSTERIOR LABRAL REPAIR;  Surgeon: Stephan Ramirez MD;  Location: [facility];  Service: Orthopedics     No Known Allergies  None     Social History     Socioeconomic History    Marital status: Single     Spouse name: Not on file    Number of children: Not on file    Years of education: Not on file    Highest education level: Not on file   Occupational History    Not on file   Tobacco Use    Smoking status: Some Days     Types: E-Cigarettes    Smokeless tobacco: Current     Types: Chew    Tobacco comments:     social smoker   Vaping Use    Vaping status: Never Used   Substance and Sexual Activity    Alcohol use: No    Drug use: No    Sexual activity: Yes     Partners: Female   Other Topics Concern    Not on file   Social History Narrative    Not on file     Social Determinants of Health     Financial Resource Strain: Not on file   Food Insecurity: Not on file   Transportation Needs: Not on file   Physical Activity: Not on file   Stress: Not on file   Social Connections: Not on file   Intimate Partner Violence: Not on file   Housing Stability: Not on file     Family History   Problem Relation Age of Onset    No Known Problems Mother     No Known Problems Father     No Known Problems Sister     No Known Problems Brother     No Known Problems Maternal Aunt     No Known Problems Maternal Uncle     No Known Problems Paternal Aunt     No Known Problems Paternal Uncle     No Known Problems Maternal Grandmother     No Known Problems Maternal Grandfather     No Known Problems Paternal Grandmother     No Known Problems Paternal Grandfather     ADD / ADHD Neg Hx     Anesthesia problems Neg Hx      Cancer Neg Hx     Clotting disorder Neg Hx     Collagen disease Neg Hx     Diabetes Neg Hx     Dislocations Neg Hx     Learning disabilities Neg Hx     Neurological problems Neg Hx     Osteoporosis Neg Hx     Rheumatologic disease Neg Hx     Scoliosis Neg Hx     Vascular Disease Neg Hx         Patient Pre-hospital Living Situation: Home  Patient Pre-hospital Level of Mobility: Independent  Patient Pre-hospital Diet Restrictions: None          Objective     Physical Exam:   Vitals:   Blood Pressure: 121/68 (06/10/24 2247)  Pulse: (!) 51 (06/10/24 2247)  Temperature: 98 °F (36.7 °C) (06/10/24 2247)  Temp Source: Oral (06/10/24 2247)  Respirations: 15 (06/10/24 2000)  Height: 6' (182.9 cm) (06/10/24 2247)  Weight - Scale: 104 kg (230 lb) (06/10/24 2247)  SpO2: 94 % (06/10/24 2247)     Physical Exam  Vitals reviewed.   Constitutional:       General: He is in acute distress (in pain 10/10).      Appearance: Normal appearance. He is obese.   Cardiovascular:      Rate and Rhythm: Normal rate and regular rhythm.      Pulses: Normal pulses.      Heart sounds: Normal heart sounds. No murmur heard.  Pulmonary:      Effort: Pulmonary effort is normal. No respiratory distress.      Breath sounds: Normal breath sounds.   Abdominal:      General: Bowel sounds are normal. There is no distension.      Palpations: Abdomen is soft.      Tenderness: There is no abdominal tenderness. There is no right CVA tenderness or left CVA tenderness.   Musculoskeletal:         General: Tenderness (right hand) and signs of injury (right hand) present. Normal range of motion.      Cervical back: Normal range of motion.      Right lower leg: No edema.      Left lower leg: No edema.   Skin:     Capillary Refill: Capillary refill takes less than 2 seconds.      Findings: Bruising (right hand) and erythema (right hand) present.   Neurological:      General: No focal deficit present.      Mental Status: He is alert and oriented to person, place, and  time.      Cranial Nerves: No cranial nerve deficit.      Sensory: Sensory deficit (present in the right middle finger, along with tingling) present.      Motor: No weakness.      Gait: Gait normal.   Psychiatric:         Mood and Affect: Mood normal.         Behavior: Behavior normal.         Thought Content: Thought content normal.             Lab Results: I have personally reviewed pertinent reports.    Results from last 7 days   Lab Units 06/10/24  1700   WBC Thousand/uL 7.83   HEMOGLOBIN g/dL 15.6   HEMATOCRIT % 45.4   PLATELETS Thousands/uL 391*   SEGS PCT % 54   LYMPHO PCT % 31   MONO PCT % 7   EOS PCT % 6     Results from last 7 days   Lab Units 06/10/24  1700   POTASSIUM mmol/L 4.2   CHLORIDE mmol/L 105   CO2 mmol/L 28   BUN mg/dL 14   CREATININE mg/dL 1.10   CALCIUM mg/dL 9.8   ALK PHOS U/L 48   ALT U/L 43   AST U/L 36   EGFR ml/min/1.73sq m 93   MAGNESIUM mg/dL 2.2                      Results from last 7 days   Lab Units 06/10/24  2350   COLOR UA  Yellow   CLARITY UA  Clear   SPEC GRAV UA  >=1.030   PH UA  6.5   LEUKOCYTES UA  Negative   NITRITE UA  Negative   GLUCOSE UA mg/dl Negative   KETONES UA mg/dl Negative   BILIRUBIN UA  Negative   BLOOD UA  Negative      Results from last 7 days   Lab Units 06/10/24  2350   RBC UA /hpf None Seen   WBC UA /hpf 0-1   EPITHELIAL CELLS WET PREP /hpf None Seen   BACTERIA UA /hpf Occasional           Results from last 7 days   Lab Units 06/10/24  1700   CK TOTAL U/L 710*        Imaging: I have personally reviewed pertinent reports.    No results found.                 ** Please Note: This note has been constructed using a voice recognition system **     Alberto Cisneros MD  06/10/24  7:02 PM

## 2024-06-11 ENCOUNTER — TELEPHONE (OUTPATIENT)
Dept: OBGYN CLINIC | Facility: CLINIC | Age: 24
End: 2024-06-11

## 2024-06-11 VITALS
DIASTOLIC BLOOD PRESSURE: 69 MMHG | TEMPERATURE: 98.1 F | SYSTOLIC BLOOD PRESSURE: 121 MMHG | OXYGEN SATURATION: 97 % | BODY MASS INDEX: 31.15 KG/M2 | RESPIRATION RATE: 18 BRPM | HEIGHT: 72 IN | HEART RATE: 57 BPM | WEIGHT: 230 LBS

## 2024-06-11 LAB
ALBUMIN SERPL BCG-MCNC: 4.2 G/DL (ref 3.5–5)
ALP SERPL-CCNC: 46 U/L (ref 34–104)
ALT SERPL W P-5'-P-CCNC: 34 U/L (ref 7–52)
AMORPH URATE CRY URNS QL MICRO: ABNORMAL
AMPHETAMINES SERPL QL SCN: NEGATIVE
ANION GAP SERPL CALCULATED.3IONS-SCNC: 6 MMOL/L (ref 4–13)
AST SERPL W P-5'-P-CCNC: 22 U/L (ref 13–39)
BACTERIA UR QL AUTO: ABNORMAL /HPF
BARBITURATES UR QL: NEGATIVE
BENZODIAZ UR QL: NEGATIVE
BILIRUB SERPL-MCNC: 0.41 MG/DL (ref 0.2–1)
BUN SERPL-MCNC: 14 MG/DL (ref 5–25)
CALCIUM SERPL-MCNC: 8.8 MG/DL (ref 8.4–10.2)
CHLORIDE SERPL-SCNC: 108 MMOL/L (ref 96–108)
CO2 SERPL-SCNC: 25 MMOL/L (ref 21–32)
COCAINE UR QL: NEGATIVE
CREAT SERPL-MCNC: 0.92 MG/DL (ref 0.6–1.3)
ERYTHROCYTE [DISTWIDTH] IN BLOOD BY AUTOMATED COUNT: 12 % (ref 11.6–15.1)
FENTANYL UR QL SCN: NEGATIVE
GFR SERPL CREATININE-BSD FRML MDRD: 116 ML/MIN/1.73SQ M
GLUCOSE SERPL-MCNC: 99 MG/DL (ref 65–140)
HCT VFR BLD AUTO: 43.3 % (ref 36.5–49.3)
HGB BLD-MCNC: 14.5 G/DL (ref 12–17)
HYDROCODONE UR QL SCN: POSITIVE
MAGNESIUM SERPL-MCNC: 2 MG/DL (ref 1.9–2.7)
MCH RBC QN AUTO: 29.8 PG (ref 26.8–34.3)
MCHC RBC AUTO-ENTMCNC: 33.5 G/DL (ref 31.4–37.4)
MCV RBC AUTO: 89 FL (ref 82–98)
METHADONE UR QL: NEGATIVE
MUCOUS THREADS UR QL AUTO: ABNORMAL
NON-SQ EPI CELLS URNS QL MICRO: ABNORMAL /HPF
OPIATES UR QL SCN: POSITIVE
OXYCODONE+OXYMORPHONE UR QL SCN: NEGATIVE
PCP UR QL: NEGATIVE
PLATELET # BLD AUTO: 310 THOUSANDS/UL (ref 149–390)
PMV BLD AUTO: 10.1 FL (ref 8.9–12.7)
POTASSIUM SERPL-SCNC: 4.1 MMOL/L (ref 3.5–5.3)
PROT SERPL-MCNC: 6.5 G/DL (ref 6.4–8.4)
RBC # BLD AUTO: 4.87 MILLION/UL (ref 3.88–5.62)
RBC #/AREA URNS AUTO: ABNORMAL /HPF
SODIUM SERPL-SCNC: 139 MMOL/L (ref 135–147)
THC UR QL: NEGATIVE
WBC # BLD AUTO: 7.45 THOUSAND/UL (ref 4.31–10.16)
WBC #/AREA URNS AUTO: ABNORMAL /HPF

## 2024-06-11 PROCEDURE — 83735 ASSAY OF MAGNESIUM: CPT

## 2024-06-11 PROCEDURE — 99235 HOSP IP/OBS SAME DATE MOD 70: CPT | Performed by: INTERNAL MEDICINE

## 2024-06-11 PROCEDURE — 80053 COMPREHEN METABOLIC PANEL: CPT

## 2024-06-11 PROCEDURE — 85027 COMPLETE CBC AUTOMATED: CPT

## 2024-06-11 PROCEDURE — 99214 OFFICE O/P EST MOD 30 MIN: CPT | Performed by: STUDENT IN AN ORGANIZED HEALTH CARE EDUCATION/TRAINING PROGRAM

## 2024-06-11 RX ORDER — ACETAMINOPHEN 325 MG/1
975 TABLET ORAL EVERY 8 HOURS SCHEDULED
Qty: 45 TABLET | Refills: 0 | Status: SHIPPED | OUTPATIENT
Start: 2024-06-11 | End: 2024-06-16

## 2024-06-11 RX ORDER — PANTOPRAZOLE SODIUM 40 MG/1
40 TABLET, DELAYED RELEASE ORAL
Qty: 7 TABLET | Refills: 0 | Status: SHIPPED | OUTPATIENT
Start: 2024-06-12 | End: 2024-06-19

## 2024-06-11 RX ORDER — NAPROXEN 500 MG/1
500 TABLET ORAL 2 TIMES DAILY WITH MEALS
Qty: 14 TABLET | Refills: 0 | Status: SHIPPED | OUTPATIENT
Start: 2024-06-11 | End: 2024-06-18

## 2024-06-11 RX ORDER — ACETAMINOPHEN 325 MG/1
975 TABLET ORAL EVERY 8 HOURS PRN
Qty: 63 TABLET | Refills: 0 | OUTPATIENT
Start: 2024-06-11 | End: 2024-07-02

## 2024-06-11 RX ORDER — NAPROXEN 500 MG/1
500 TABLET ORAL 2 TIMES DAILY WITH MEALS
Qty: 28 TABLET | Refills: 0 | OUTPATIENT
Start: 2024-06-11 | End: 2024-06-25

## 2024-06-11 RX ADMIN — MORPHINE SULFATE 2 MG: 2 INJECTION, SOLUTION INTRAMUSCULAR; INTRAVENOUS at 03:35

## 2024-06-11 RX ADMIN — OXYCODONE HYDROCHLORIDE 5 MG: 5 TABLET ORAL at 08:49

## 2024-06-11 RX ADMIN — ACETAMINOPHEN 975 MG: 325 TABLET ORAL at 05:52

## 2024-06-11 RX ADMIN — KETOROLAC TROMETHAMINE 15 MG: 30 INJECTION, SOLUTION INTRAMUSCULAR; INTRAVENOUS at 05:52

## 2024-06-11 NOTE — PROGRESS NOTES
Chief Complaint       Right middle and ring finger crush injury       History of the Present Illness     Trey Barrientos is a 23 y.o. male who presented to the emergency room after he sustained a right hand crush injury at work.  He is unsure if this crush injury happened to his digits hand or forearm.  I was contacted by the emergency room for concern for compartment syndrome and pain that was not controlled with narcotics.  Patient reports most of his pain is to his distal middle and ring finger, reports no pain today palm or forearm.  Reports decreased sensation to the distal tip of the middle and ring however normal sensation to the other digits.    Interval Hx overnight: Pain controlled.               Past Medical History:   Diagnosis Date    Anesthesia complication     Sister had difficulty waking up    Fracture     of radius and ulna-reduced with out surgery-still bothers him    Shoulder pain, left     Wears glasses        Past Surgical History:   Procedure Laterality Date    FL INJECTION LEFT SHOULDER (ARTHROGRAM)  2/25/2020    NO PAST SURGERIES      [place] SURGICAL ARTHROSCOPY SHOULDER CAPSULORRHAPHY Left 8/23/2018    Procedure: SHOULDER ARTHROSCOPY POSTERIOR LABRAL REPAIR;  Surgeon: Stephan Ramirez MD;  Location: [facility];  Service: Orthopedics       No Known Allergies    No current facility-administered medications on file prior to encounter.     No current outpatient medications on file prior to encounter.       Social History     Tobacco Use    Smoking status: Some Days     Types: E-Cigarettes    Smokeless tobacco: Current     Types: Chew    Tobacco comments:     social smoker   Vaping Use    Vaping status: Never Used   Substance Use Topics    Alcohol use: No    Drug use: No       Family History   Problem Relation Age of Onset    No Known Problems Mother     No Known Problems Father     No Known Problems Sister     No Known Problems Brother     No Known Problems Maternal Aunt     No Known Problems  Maternal Uncle     No Known Problems Paternal Aunt     No Known Problems Paternal Uncle     No Known Problems Maternal Grandmother     No Known Problems Maternal Grandfather     No Known Problems Paternal Grandmother     No Known Problems Paternal Grandfather     ADD / ADHD Neg Hx     Anesthesia problems Neg Hx     Cancer Neg Hx     Clotting disorder Neg Hx     Collagen disease Neg Hx     Diabetes Neg Hx     Dislocations Neg Hx     Learning disabilities Neg Hx     Neurological problems Neg Hx     Osteoporosis Neg Hx     Rheumatologic disease Neg Hx     Scoliosis Neg Hx     Vascular Disease Neg Hx        Review of Systems     As stated in the HPI. All other systems were reviewed and are negative.      Physical Exam     /68   Pulse (!) 51   Temp 98 °F (36.7 °C) (Oral)   Resp 15   Ht 6' (1.829 m)   Wt 104 kg (230 lb)   SpO2 94%   BMI 31.19 kg/m²     GENERAL: This is a well-developed, well-nourished, age-appropriate patient in no acute distress. The patient is alert and oriented x3. Pleasant and cooperative.  Eyes: Anicteric sclerae. Extraocular movements appear intact.  HENT: Nares are patent with no drainage.  Lungs: There is equal chest rise on inspection. Breathing is non-labored with no audible wheezing.  Cardiovascular: No cyanosis. No upper extremity lymphadema.  Skin: Skin is warm to touch. No obvious skin lesions or rashes other than described below.  Neurologic: No ataxia  Psychiatric: Mood and affect are appropriate.    Exam:  RUE  Compartments of forearm and hand are soft and has no pain with palpation of compartments  Wrist range of motion painless  Reports pain with finger flexion and extension to middle and ring finger  Swelling limited to distal tip of middle finger otherwise no swelling to hand or forearm  FDS and FDP intact to all digits  FPL and EPL intact  Sensation intact to all digits however altered to the tip of the middle and ring finger per patient  Can extend each digit      Data  Review     Results Reviewed       Procedure Component Value Units Date/Time    Comprehensive metabolic panel [[id number]] Collected: 06/11/24 0551    Lab Status: Final result Specimen: Blood from Arm, Left Updated: 06/11/24 0636     Sodium 139 mmol/L      Potassium 4.1 mmol/L      Chloride 108 mmol/L      CO2 25 mmol/L      ANION GAP 6 mmol/L      BUN 14 mg/dL      Creatinine 0.92 mg/dL      Glucose 99 mg/dL      Calcium 8.8 mg/dL      AST 22 U/L      ALT 34 U/L      Alkaline Phosphatase 46 U/L      Total Protein 6.5 g/dL      Albumin 4.2 g/dL      Total Bilirubin 0.41 mg/dL      eGFR 116 ml/min/1.73sq m     Narrative:      National Kidney Disease Foundation guidelines for Chronic Kidney Disease (CKD):     Stage 1 with normal or high GFR (GFR > 90 mL/min/1.73 square meters)    Stage 2 Mild CKD (GFR = 60-89 mL/min/1.73 square meters)    Stage 3A Moderate CKD (GFR = 45-59 mL/min/1.73 square meters)    Stage 3B Moderate CKD (GFR = 30-44 mL/min/1.73 square meters)    Stage 4 Severe CKD (GFR = 15-29 mL/min/1.73 square meters)    Stage 5 End Stage CKD (GFR <15 mL/min/1.73 square meters)  Note: GFR calculation is accurate only with a steady state creatinine    Magnesium [[id number]]  (Normal) Collected: 06/11/24 0551    Lab Status: Final result Specimen: Blood from Arm, Left Updated: 06/11/24 0636     Magnesium 2.0 mg/dL     CBC (With Platelets) [[id number]]  (Normal) Collected: 06/11/24 0551    Lab Status: Final result Specimen: Blood from Arm, Left Updated: 06/11/24 0601     WBC 7.45 Thousand/uL      RBC 4.87 Million/uL      Hemoglobin 14.5 g/dL      Hematocrit 43.3 %      MCV 89 fL      MCH 29.8 pg      MCHC 33.5 g/dL      RDW 12.0 %      Platelets 310 Thousands/uL      MPV 10.1 fL     Rapid drug screen, urine [[id number]]  (Abnormal) Collected: 06/10/24 1550    Lab Status: Final result Specimen: Urine, Clean Catch Updated: 06/11/24 0018     Amph/Meth UR Negative     Barbiturate Ur Negative     Benzodiazepine Urine Negative      Cocaine Urine Negative     Methadone Urine Negative     Opiate Urine Positive     PCP Ur Negative     THC Urine Negative     Oxycodone Urine Negative     Fentanyl Urine Negative     HYDROCODONE URINE Positive    Narrative:      Presumptive report. If requested, specimen will be sent to reference lab for confirmation.  FOR MEDICAL PURPOSES ONLY.   IF CONFIRMATION NEEDED PLEASE CONTACT THE LAB WITHIN 5 DAYS.    Drug Screen Cutoff Levels:  AMPHETAMINE/METHAMPHETAMINES  1000 ng/mL  BARBITURATES     200 ng/mL  BENZODIAZEPINES     200 ng/mL  COCAINE      300 ng/mL  METHADONE      300 ng/mL  OPIATES      300 ng/mL  PHENCYCLIDINE     25 ng/mL  THC       50 ng/mL  OXYCODONE      100 ng/mL  FENTANYL      5 ng/mL  HYDROCODONE     300 ng/mL    UA w Reflex to Microscopic w Reflex to Culture [[id number]]  (Abnormal) Collected: 06/10/24 2350    Lab Status: Final result Specimen: Urine, Clean Catch Updated: 06/10/24 2358     Color, UA Yellow     Clarity, UA Clear     Specific Gravity, UA >=1.030     pH, UA 6.5     Leukocytes, UA Negative     Nitrite, UA Negative     Protein, UA Trace mg/dl      Glucose, UA Negative mg/dl      Ketones, UA Negative mg/dl      Urobilinogen, UA <2.0 mg/dl      Bilirubin, UA Negative     Occult Blood, UA Negative    CK [[id number]]  (Abnormal) Collected: 06/10/24 1700    Lab Status: Final result Specimen: Blood from Arm, Left Updated: 06/10/24 1939     Total  U/L     Narrative:      Specimen Lipemic.    Comprehensive metabolic panel [[id number]]  (Abnormal) Collected: 06/10/24 1700    Lab Status: Final result Specimen: Blood from Arm, Left Updated: 06/10/24 1803     Sodium 140 mmol/L      Potassium 4.2 mmol/L      Chloride 105 mmol/L      CO2 28 mmol/L      ANION GAP 7 mmol/L      BUN 14 mg/dL      Creatinine 1.10 mg/dL      Glucose 93 mg/dL      Calcium 9.8 mg/dL      AST 36 U/L      ALT 43 U/L      Alkaline Phosphatase 48 U/L      Total Protein 7.8 g/dL      Albumin 5.1 g/dL      Total  Bilirubin 0.40 mg/dL      eGFR 93 ml/min/1.73sq m     Narrative:      Specimen Lipemic.  National Kidney Disease Foundation guidelines for Chronic Kidney Disease (CKD):     Stage 1 with normal or high GFR (GFR > 90 mL/min/1.73 square meters)    Stage 2 Mild CKD (GFR = 60-89 mL/min/1.73 square meters)    Stage 3A Moderate CKD (GFR = 45-59 mL/min/1.73 square meters)    Stage 3B Moderate CKD (GFR = 30-44 mL/min/1.73 square meters)    Stage 4 Severe CKD (GFR = 15-29 mL/min/1.73 square meters)    Stage 5 End Stage CKD (GFR <15 mL/min/1.73 square meters)  Note: GFR calculation is accurate only with a steady state creatinine    Magnesium [[id number]]  (Normal) Collected: 06/10/24 1700    Lab Status: Final result Specimen: Blood from Arm, Left Updated: 06/10/24 1803     Magnesium 2.2 mg/dL     Narrative:      Specimen Lipemic.    CBC and differential [[id number]]  (Abnormal) Collected: 06/10/24 1700    Lab Status: Final result Specimen: Blood from Arm, Left Updated: 06/10/24 1706     WBC 7.83 Thousand/uL      RBC 5.19 Million/uL      Hemoglobin 15.6 g/dL      Hematocrit 45.4 %      MCV 88 fL      MCH 30.1 pg      MCHC 34.4 g/dL      RDW 11.9 %      MPV 10.1 fL      Platelets 391 Thousands/uL      nRBC 0 /100 WBCs      Segmented % 54 %      Immature Grans % 1 %      Lymphocytes % 31 %      Monocytes % 7 %      Eosinophils Relative 6 %      Basophils Relative 1 %      Absolute Neutrophils 4.19 Thousands/µL      Absolute Immature Grans 0.09 Thousand/uL      Absolute Lymphocytes 2.42 Thousands/µL      Absolute Monocytes 0.57 Thousand/µL      Eosinophils Absolute 0.46 Thousand/µL      Basophils Absolute 0.10 Thousands/µL                Imaging:  Right hand, wrist, forearm x-ray: Only bony injury seen is right middle finger tuft fracture        Assessment and Plan       23-year-old male right hand crush injury with right middle finger distal tuft fracture and ring finger.   -Exam this morning is NOT concerning for compartment  syndrome. Only has pain to ring and middle ringer. Is concerned for decreased sensation to these digits and discussed this could be permanent due to the nature of a crush injury   -Splinted for soft tissue rest  -OK for discharge today as follow up this Friday in clinic with me  -Tylenol and naproxen for pain control

## 2024-06-11 NOTE — PLAN OF CARE
Problem: PAIN - ADULT  Goal: Verbalizes/displays adequate comfort level or baseline comfort level  Description: Interventions:  - Encourage patient to monitor pain and request assistance  - Assess pain using appropriate pain scale  - Administer analgesics based on type and severity of pain and evaluate response  - Implement non-pharmacological measures as appropriate and evaluate response  - Consider cultural and social influences on pain and pain management  - Notify physician/advanced practitioner if interventions unsuccessful or patient reports new pain  Outcome: Progressing     Problem: INFECTION - ADULT  Goal: Absence or prevention of progression during hospitalization  Description: INTERVENTIONS:  - Assess and monitor for signs and symptoms of infection  - Monitor lab/diagnostic results  - Monitor all insertion sites, i.e. indwelling lines, tubes, and drains  - Monitor endotracheal if appropriate and nasal secretions for changes in amount and color  - Girdler appropriate cooling/warming therapies per order  - Administer medications as ordered  - Instruct and encourage patient and family to use good hand hygiene technique  - Identify and instruct in appropriate isolation precautions for identified infection/condition  Outcome: Progressing  Goal: Absence of fever/infection during neutropenic period  Description: INTERVENTIONS:  - Monitor WBC    Outcome: Progressing     Problem: SAFETY ADULT  Goal: Patient will remain free of falls  Description: INTERVENTIONS:  - Educate patient/family on patient safety including physical limitations  - Instruct patient to call for assistance with activity   - Consult OT/PT to assist with strengthening/mobility   - Keep Call bell within reach  - Keep bed low and locked with side rails adjusted as appropriate  - Keep care items and personal belongings within reach  - Initiate and maintain comfort rounds  - Make Fall Risk Sign visible to staff  - Offer Toileting every  Hours,  in advance of need  - Initiate/Maintain alarm  - Obtain necessary fall risk management equipment:   - Apply yellow socks and bracelet for high fall risk patients  - Consider moving patient to room near nurses station  Outcome: Progressing  Goal: Maintain or return to baseline ADL function  Description: INTERVENTIONS:  -  Assess patient's ability to carry out ADLs; assess patient's baseline for ADL function and identify physical deficits which impact ability to perform ADLs (bathing, care of mouth/teeth, toileting, grooming, dressing, etc.)  - Assess/evaluate cause of self-care deficits   - Assess range of motion  - Assess patient's mobility; develop plan if impaired  - Assess patient's need for assistive devices and provide as appropriate  - Encourage maximum independence but intervene and supervise when necessary  - Involve family in performance of ADLs  - Assess for home care needs following discharge   - Consider OT consult to assist with ADL evaluation and planning for discharge  - Provide patient education as appropriate  Outcome: Progressing  Goal: Maintains/Returns to pre admission functional level  Description: INTERVENTIONS:  - Perform AM-PAC 6 Click Basic Mobility/ Daily Activity assessment daily.  - Set and communicate daily mobility goal to care team and patient/family/caregiver.   - Collaborate with rehabilitation services on mobility goals if consulted  - Perform Range of Motion  times a day.  - Reposition patient every  hours.  - Dangle patient times a day  - Stand patient  times a day  - Ambulate patient  times a day  - Out of bed to chair  times a day   - Out of bed for meals  times a day  - Out of bed for toileting  - Record patient progress and toleration of activity level   Outcome: Progressing    Problem: DISCHARGE PLANNING  Goal: Discharge to home or other facility with appropriate resources  Description: INTERVENTIONS:  - Identify barriers to discharge w/patient and caregiver  - Arrange for  needed discharge resources and transportation as appropriate  - Identify discharge learning needs (meds, wound care, etc.)  - Arrange for interpretive services to assist at discharge as needed  - Refer to Case Management Department for coordinating discharge planning if the patient needs post-hospital services based on physician/advanced practitioner order or complex needs related to functional status, cognitive ability, or social support system  Outcome: Progressing     Problem: Knowledge Deficit  Goal: Patient/family/caregiver demonstrates understanding of disease process, treatment plan, medications, and discharge instructions  Description: Complete learning assessment and assess knowledge base.  Interventions:  - Provide teaching at level of understanding  - Provide teaching via preferred learning methods  Outcome: Progressing

## 2024-06-11 NOTE — UTILIZATION REVIEW
Initial Clinical Review    Admission: Date/Time/Statement:   Admission Orders (From admission, onward)       Ordered        06/10/24 1815  Place in Observation  Once                          Orders Placed This Encounter   Procedures    Place in Observation     Standing Status:   Standing     Number of Occurrences:   1     Order Specific Question:   Level of Care     Answer:   Med Surg [16]     ED Arrival Information       Expected   -    Arrival   6/10/2024 16:10    Acuity   Urgent              Means of arrival   Walk-In    Escorted by   Family Member    Service   Family Medicine    Admission type   Emergency              Arrival complaint   hand injury, loc             Chief Complaint   Patient presents with    Crush Injury     Here with father, at home building a pole barn and was lifting a 250 lb beam with another person who dropped their end and beam fell onto patients right hand and wrist.        Initial Presentation:   23 yom to ER from home after hand crush injury; slammed by two 250 pound block of wood to the right hand while working on a project. Now he has severe pain to right second, third, fourth, and fifth digit with significantly decreased sensation. He remembers screaming in pain with blackout, but does not report any fall, hitting his head, fainting, syncope or dizziness. Admission xrays showing Distal phalanx fracture noted to right third digit. Placed in observation status for crush injury R hand. Ortho consulted.    Per ortho: right hand crush injury with right middle finger distal tuft fracture  -On exam during evaluation patient did not appear to be in pain however did report extreme pain to his middle and ring finger.  I had an extensive discussion with him about the symptoms of compartment syndrome.  On my exam I am not concerned for compartment syndrome but based on presentation per ER I would like him admitted to monitor him overnight.   -Cont to monitor patient every 2-3 hours for  "progressing pain- Please contact me if symptoms progress  -Cont with pain control   -Splinted and elevated  -Keep NPO overnight    ED Triage Vitals   Temperature Pulse Respirations Blood Pressure SpO2   06/10/24 1619 06/10/24 1619 06/10/24 1619 06/10/24 1619 06/10/24 1619   99.1 °F (37.3 °C) 70 18 142/85 94 %      Temp Source Heart Rate Source Patient Position - Orthostatic VS BP Location FiO2 (%)   06/10/24 1619 06/10/24 1619 -- -- --   Tympanic Monitor         Pain Score       06/10/24 1617       10 - Worst Possible Pain          Wt Readings from Last 1 Encounters:   06/10/24 104 kg (230 lb)     Additional Vital Signs:     Pertinent Labs/Diagnostic Test Results:   XR hand 3+ views RIGHT   ED Interpretation by Teena Cobb DO (06/10 2000)   Distal phalanx fracture noted to right third digit.  Formal radiology reading pending.      XR forearm 2 views RIGHT    (Results Pending)   XR wrist 3+ views RIGHT    (Results Pending)         Results from last 7 days   Lab Units 06/11/24  0551 06/10/24  1700   WBC Thousand/uL 7.45 7.83   HEMOGLOBIN g/dL 14.5 15.6   HEMATOCRIT % 43.3 45.4   PLATELETS Thousands/uL 310 391*   TOTAL NEUT ABS Thousands/µL  --  4.19         Results from last 7 days   Lab Units 06/11/24  0551 06/10/24  1700   SODIUM mmol/L 139 140   POTASSIUM mmol/L 4.1 4.2   CHLORIDE mmol/L 108 105   CO2 mmol/L 25 28   ANION GAP mmol/L 6 7   BUN mg/dL 14 14   CREATININE mg/dL 0.92 1.10   EGFR ml/min/1.73sq m 116 93   CALCIUM mg/dL 8.8 9.8   MAGNESIUM mg/dL 2.0 2.2     Results from last 7 days   Lab Units 06/11/24  0551 06/10/24  1700   AST U/L 22 36   ALT U/L 34 43   ALK PHOS U/L 46 48   TOTAL PROTEIN g/dL 6.5 7.8   ALBUMIN g/dL 4.2 5.1*   TOTAL BILIRUBIN mg/dL 0.41 0.40         Results from last 7 days   Lab Units 06/11/24  0551 06/10/24  1700   GLUCOSE RANDOM mg/dL 99 93             No results found for: \"BETA-HYDROXYBUTYRATE\"               Results from last 7 days   Lab Units 06/10/24  1700   CK TOTAL U/L 710* "                                                                         Results from last 7 days   Lab Units 06/10/24  2350   CLARITY UA  Clear   COLOR UA  Yellow   SPEC GRAV UA  >=1.030   PH UA  6.5   GLUCOSE UA mg/dl Negative   KETONES UA mg/dl Negative   BLOOD UA  Negative   PROTEIN UA mg/dl Trace*   NITRITE UA  Negative   BILIRUBIN UA  Negative   UROBILINOGEN UA (BE) mg/dl <2.0   LEUKOCYTES UA  Negative   WBC UA /hpf 0-1   RBC UA /hpf None Seen   BACTERIA UA /hpf Occasional   EPITHELIAL CELLS WET PREP /hpf None Seen   MUCUS THREADS  Occasional*             Results from last 7 days   Lab Units 06/10/24  2350   AMPH/METH  Negative   BARBITURATE UR  Negative   BENZODIAZEPINE UR  Negative   COCAINE UR  Negative   METHADONE URINE  Negative   OPIATE UR  Positive*   PCP UR  Negative   THC UR  Negative                                       ED Treatment:   Medication Administration from 06/10/2024 1610 to 06/10/2024 2244         Date/Time Order Dose Route Action     06/10/2024 1701 EDT sodium chloride 0.9 % bolus 1,000 mL 1,000 mL Intravenous New Bag     06/10/2024 1658 EDT HYDROmorphone (DILAUDID) injection 1 mg 1 mg Intravenous Given     06/10/2024 2100 EDT sodium chloride 0.9 % infusion 125 mL/hr Intravenous New Bag     06/10/2024 1946 EDT morphine injection 2 mg 2 mg Intravenous Given     06/10/2024 2131 EDT acetaminophen (TYLENOL) tablet 975 mg 975 mg Oral Given     06/10/2024 1915 EDT ketorolac (TORADOL) injection 15 mg 15 mg Intravenous Given          Past Medical History:   Diagnosis Date    Anesthesia complication     Sister had difficulty waking up    Fracture     of radius and ulna-reduced with out surgery-still bothers him    Shoulder pain, left     Wears glasses      Present on Admission:   Crush injury of hand, right, initial encounter      Admitting Diagnosis: Crush injury [T14.8XXA]  Finger fracture, right [S62.609A]  Crush injury of hand, right, initial encounter [S67.21XA]  Age/Sex: 23 y.o.  male  Admission Orders:  Cont pulse ox    Scheduled Medications:  acetaminophen, 975 mg, Oral, Q8H DECLAN  ketorolac, 15 mg, Intravenous, Q6H DECLAN  pantoprazole, 40 mg, Oral, Early Morning    Continuous IV Infusions:  sodium chloride, 125 mL/hr, Intravenous, Continuous    PRN Meds:  morphine injection, 2 mg, Intravenous, Q2H PRN  oxyCODONE, 2.5 mg, Oral, Q4H PRN   Or  oxyCODONE, 5 mg, Oral, Q4H PRN    Network Utilization Review Department  ATTENTION: Please call with any questions or concerns to [phone] and carefully listen to the prompts so that you are directed to the right person. All voicemails are confidential.   For Discharge needs, contact Care Management DC Support Team at [phone] opt. 2  Send all requests for admission clinical reviews, approved or denied determinations and any other requests to dedicated fax number below belonging to the campus where the patient is receiving treatment. List of dedicated fax numbers for the Facilities:  FACILITY NAME UR FAX NUMBER   ADMISSION DENIALS (Administrative/Medical Necessity) [phone]   DISCHARGE SUPPORT TEAM (NETWORK) [phone]   PARENT CHILD HEALTH (Maternity/NICU/Pediatrics) [phone]   [facility] [phone]   [facility] [phone]   [facility] [phone]   [facility] [phone]   [facility] [phone]   [facility] [phone]   [facility] [phone]   [facility] [phone]   [facility] [phone]   [facility] [phone]   [facility] [phone]   [facility] [phone]

## 2024-06-11 NOTE — DISCHARGE SUMMARY
Discharge Summary - [facility]     Patient Information: Trey Barrientos 23 y.o. male MRN: [id number]  Unit/Bed#: [address] [place] [address] Encounter: [id number]     Admitting Physician: Justin Canales MD  Discharging Physician/Practitioner: No att. providers found   PCP: No primary care provider on file.  Admission Date:   Admission Orders (From admission, onward)       Ordered        06/10/24 1815  Place in Observation  Once                          Discharge Date: 06/11/24      Reason for Admission: Crush injury [T14.8XXA]  Finger fracture, right [S62.609A]  Crush injury of hand, right, initial encounter [S67.21XA]     Discharge Diagnoses:      Principal Problem:    Crush injury of hand, right, initial encounter  Active Problems:    Labral tear of shoulder, left, subsequent encounter  Resolved Problems:    * No resolved hospital problems. *       Consultations During Hospital Stay:  -Orthopedic surgery     Procedures Performed:   -Splint of right hand      Significant Findings / Test Results:   6/11 CBC, CMP , mag WNL  6/10: WBC WNL, hgb WNL, platelets 391, CMP WNL, mag WNL, ,   UA: trace protein, occasional mucus thread  UDS: positive opiate, hydrocodone     XR WRIST 3+ VW RIGHT: No acute osseous abnormality    XR FOREARM 2 VW RIGHT: No acute osseous abnormality     XR HAND 3+ VW RIGHT: Nondisplaced fracture of the third distal phalanx     Incidental Findings:   -none     Test Results Pending at Discharge (will require follow up):   -none     Outpatient Tests Requested:  -none     Outpatient follow-up Requested:  -PCP, Orthopedic surgery    Complications:    none    Things to address at first visit after hospitalization   -Did you follow up with orthopedic surgery?  -Do you have sensation in your hand?  -How is your pain?    Hospital Course:      Trey Barrientos is a 23 y.o. male patient who originally presented to the hospital on 6/10/2024 due to crush injury on right hand. Past  medical history includes left labral shoulder tear, otherwise unremarkable. Evaluated in ED by orthopedics due to crush injury with loss of sensation and pain not controlled by narcotics. No evidence of compartment syndrome, patient to remain in splint and follow up with orthopedics as an outpatient. Stable for discharge to home.     * Crush injury of hand, right, initial encounter  Assessment & Plan  ED reporting right hand crush injury s/p lifting 250 lb beam. Right hand dominant - evaluated in ED by orthopedic surgery - decreased sensation in 3rd & 4th digit digit.  Splint completed by orthopedics in ED - See attached pictures in media.    ED course: 1 L NS bolus, 1mg Dilaudid  Lab work: CBC WNL, BMP WNL  Xray: fracture visible of the distal phalynx of middle finger  R wrist XR, R forearm: No acute osseous abnormality   CK: 710     Plan:   -Discharge to home with Tylenol and naproxen for pain control  -Follow up with orthopedic surgery   -Follow up with PCP    Labral tear of shoulder, left, subsequent encounter  Assessment & Plan  This is a chronic problem. Patient is s/p labral tear repair of his left shoulder in August 2018. Patient got injured while wrestling and had a posterior subluxation of the shoulder. MR arthrogram of the shoulder showed a linear tear within the posterior labrum, without detachment.      Patient follows with orthopedics Dr. Ramirez   He does not have any symptoms of tingling, numbness or restrictive ROM on his left arm             Condition at Discharge: stable      Discharge Day Visit / Exam:      Vitals: Blood Pressure: 121/69 (06/11/24 0836)  Pulse: 57 (06/11/24 0836)  Temperature: 98.1 °F (36.7 °C) (06/11/24 0836)  Temp Source: Oral (06/11/24 0836)  Respirations: 18 (06/11/24 0836)  Height: 6' (182.9 cm) (06/10/24 2247)  Weight - Scale: 104 kg (230 lb) (06/10/24 2247)  SpO2: 97 % (06/11/24 0836)  Exam:   Physical Exam  HENT:      Head: Normocephalic and atraumatic.      Right Ear:  External ear normal.      Left Ear: External ear normal.      Mouth/Throat:      Pharynx: Oropharynx is clear.   Eyes:      Conjunctiva/sclera: Conjunctivae normal.   Cardiovascular:      Rate and Rhythm: Normal rate and regular rhythm.      Heart sounds: Normal heart sounds.   Pulmonary:      Effort: Pulmonary effort is normal.      Breath sounds: Normal breath sounds.   Abdominal:      General: Bowel sounds are normal.      Palpations: Abdomen is soft.   Musculoskeletal:      Right lower leg: No edema.      Left lower leg: No edema.   Skin:     General: Skin is warm and dry.      Comments: Right arm in splint with redness at right 4th digit    Neurological:      Mental Status: He is alert and oriented to person, place, and time.         Patient Information Sharing: With the consent of Trey Cookjohn, their loved ones were notified today by inpatient team of the patient’s condition and current plan.  All questions answered.      Discharge instructions/Information to patient and family:   See after visit summary for information provided to patient and family.       Discharge Medications:     Medication List      START taking these medications     acetaminophen 325 mg tablet; Commonly known as: TYLENOL; Take 3 tablets   (975 mg total) by mouth every 8 (eight) hours for 5 days   naproxen 500 MG EC tablet; Commonly known as: EC NAPROSYN; Take 1 tablet   (500 mg total) by mouth 2 (two) times a day with meals for 7 days   pantoprazole 40 mg tablet; Commonly known as: PROTONIX; Take 1 tablet   (40 mg total) by mouth daily in the early morning for 7 days; Start taking   on: June 12, 2024        Disposition:   Home     Discharge Statement:  I spent 35 minutes discharging the patient. This time was spent on the day of discharge. I had direct contact with the patient on the day of discharge. Greater than 50% of the total time was spent examining patient, answering all patient questions, arranging and discussing plan of care  with patient as well as directly providing post-discharge instructions.  Additional time then spent on discharge activities.     ** Please Note: This note has been constructed using a voice recognition system **     Micehll Smith DO   06/11/24  12:40 PM

## 2024-06-11 NOTE — TELEPHONE ENCOUNTER
----- Message from Rossy Larson MD sent at 6/11/2024  8:10 AM EDT -----  Regarding: follow up  Needs follow up this Friday with me in clinic, thank you

## 2024-06-12 NOTE — NURSING NOTE
Patient's IV removed. AVS reviewed with patient by Elisha MEZA. Patient left [address] in stable condition with all belongings.

## 2024-06-13 ENCOUNTER — TELEPHONE (OUTPATIENT)
Age: 24
End: 2024-06-13

## 2024-06-13 NOTE — TELEPHONE ENCOUNTER
Contacted patient, he declined to be seen.  He is moving to the [facility] so said he is going to find a PCP there.

## 2024-06-13 NOTE — TELEPHONE ENCOUNTER
----- Message from Nathan ELIZONDO sent at 6/13/2024  8:30 AM EDT -----  Clerical- please call to schedule OVL for hospital follow up.  TCM call was not made within 48 hrs.  ----- Message -----  From: Alberto Cisneros MD  Sent: 6/11/2024   6:16 PM EDT  To: Kelly Mccollum; Francy Mendoza and Francy,   Can you please arrange a TCM appointment with me?     Thanks,    Alberto Cisneros MD

## 2024-06-20 ENCOUNTER — TELEPHONE (OUTPATIENT)
Dept: OBGYN CLINIC | Facility: HOSPITAL | Age: 24
End: 2024-06-20

## 2024-06-20 NOTE — TELEPHONE ENCOUNTER
Hello,  Please advise if the following patient can be forced onto the schedule:    Trey    Call back #: [phone]    Insurance: [employer]    Reason for appointment: Crush injury of hand, right, initial encounter//Finger fracture, right//Tip of finger fracture/Nondisplaced fracture of the third distal phalanx     Requested doctor and/or location: [facility] [place] Wants tomorrow if possible// Steph booked   Wherever he can seen sooner! Not sure if [facility] will see.       Thank you.

## 2024-06-24 ENCOUNTER — OFFICE VISIT (OUTPATIENT)
Dept: OBGYN CLINIC | Facility: CLINIC | Age: 24
End: 2024-06-24
Payer: COMMERCIAL

## 2024-06-24 VITALS
BODY MASS INDEX: 31.15 KG/M2 | HEART RATE: 56 BPM | DIASTOLIC BLOOD PRESSURE: 80 MMHG | WEIGHT: 230 LBS | SYSTOLIC BLOOD PRESSURE: 116 MMHG | HEIGHT: 72 IN

## 2024-06-24 DIAGNOSIS — S62.609A FINGER FRACTURE, RIGHT: ICD-10-CM

## 2024-06-24 DIAGNOSIS — S67.21XA CRUSH INJURY OF HAND, RIGHT, INITIAL ENCOUNTER: ICD-10-CM

## 2024-06-24 PROCEDURE — 99213 OFFICE O/P EST LOW 20 MIN: CPT | Performed by: PHYSICIAN ASSISTANT

## 2024-06-24 NOTE — LETTER
June 24, 2024     Patient: Trey Barrientos  YOB: 2000  Date of Visit: 6/24/2024      To Whom it May Concern:    Trey Barrientos is under my professional care. Trey was seen in my office on 6/24/2024. Trey may return to work on 6/28/2024 with light duty activities. He will be reassessed in 2 weeks for return to work .    If you have any questions or concerns, please don't hesitate to call.         Sincerely,          Cesario Grider PA-C        CC: No Recipients

## 2024-07-08 ENCOUNTER — OFFICE VISIT (OUTPATIENT)
Dept: OBGYN CLINIC | Facility: CLINIC | Age: 24
End: 2024-07-08
Payer: COMMERCIAL

## 2024-07-08 VITALS
DIASTOLIC BLOOD PRESSURE: 86 MMHG | HEIGHT: 72 IN | WEIGHT: 230 LBS | BODY MASS INDEX: 31.15 KG/M2 | SYSTOLIC BLOOD PRESSURE: 131 MMHG | HEART RATE: 66 BPM

## 2024-07-08 DIAGNOSIS — S62.639A CLOSED FRACTURE OF TUFT OF DISTAL PHALANX OF FINGER: Primary | ICD-10-CM

## 2024-07-08 PROCEDURE — 99213 OFFICE O/P EST LOW 20 MIN: CPT | Performed by: PHYSICIAN ASSISTANT

## 2024-07-08 NOTE — LETTER
July 8, 2024     Patient: Trey Barrientos  YOB: 2000  Date of Visit: 7/8/2024      To Whom it May Concern:    Trey Barrientos is under my professional care. Trey was seen in my office on 7/8/2024. Trey may return to work on 7/9/2024 .    If you have any questions or concerns, please don't hesitate to call.         Sincerely,          Cesario Grider PA-C        CC: No Recipients

## 2024-07-08 NOTE — PROGRESS NOTES
ASSESSMENT/PLAN:    Assessment:   Healing right long finger tuft fracture    Plan:   Resume all activities as tolerated    Follow Up:  PRN    _____________________________________________________  CHIEF COMPLAINT:  Chief Complaint   Patient presents with    Right Hand - Fracture, Follow-up         SUBJECTIVE:  Trey Barrientos is a 23 y.o. male who presents for follow up regarding right long finger tuft fracture. Patient denies any pain at this time. He has been doing regular activities at home, and was able to DC use of the splint without pain. He denies any other acute complaints.     PAST MEDICAL HISTORY:  Past Medical History:   Diagnosis Date    Anesthesia complication     Sister had difficulty waking up    Fracture     of radius and ulna-reduced with out surgery-still bothers him    Shoulder pain, left     Wears glasses        PAST SURGICAL HISTORY:  Past Surgical History:   Procedure Laterality Date    FL INJECTION LEFT SHOULDER (ARTHROGRAM)  2/25/2020    NO PAST SURGERIES      NM SURGICAL ARTHROSCOPY SHOULDER CAPSULORRHAPHY Left 8/23/2018    Procedure: SHOULDER ARTHROSCOPY POSTERIOR LABRAL REPAIR;  Surgeon: Stephan Ramirez MD;  Location: [facility] [place];  Service: Orthopedics       FAMILY HISTORY:  Family History   Problem Relation Age of Onset    No Known Problems Mother     No Known Problems Father     No Known Problems Sister     No Known Problems Brother     No Known Problems Maternal Aunt     No Known Problems Maternal Uncle     No Known Problems Paternal Aunt     No Known Problems Paternal Uncle     No Known Problems Maternal Grandmother     No Known Problems Maternal Grandfather     No Known Problems Paternal Grandmother     No Known Problems Paternal Grandfather     ADD / ADHD Neg Hx     Anesthesia problems Neg Hx     Cancer Neg Hx     Clotting disorder Neg Hx     Collagen disease Neg Hx     Diabetes Neg Hx     Dislocations Neg Hx     Learning disabilities Neg Hx     Neurological problems Neg Hx      "Osteoporosis Neg Hx     Rheumatologic disease Neg Hx     Scoliosis Neg Hx     Vascular Disease Neg Hx        SOCIAL HISTORY:  Social History     Tobacco Use    Smoking status: Some Days     Types: E-Cigarettes    Smokeless tobacco: Current     Types: Chew    Tobacco comments:     social smoker   Vaping Use    Vaping status: Never Used   Substance Use Topics    Alcohol use: No    Drug use: No       MEDICATIONS:    Current Outpatient Medications:     naproxen (EC NAPROSYN) 500 MG EC tablet, Take 1 tablet (500 mg total) by mouth 2 (two) times a day with meals for 7 days, Disp: 14 tablet, Rfl: 0    pantoprazole (PROTONIX) 40 mg tablet, Take 1 tablet (40 mg total) by mouth daily in the early morning for 7 days, Disp: 7 tablet, Rfl: 0    ALLERGIES:  No Known Allergies    REVIEW OF SYSTEMS:  Pertinent items are noted in HPI.  A comprehensive review of systems was negative.    LABS:  HgA1c: No results found for: \"HGBA1C\"  BMP:   Lab Results   Component Value Date    CALCIUM 8.8 06/11/2024    K 4.1 06/11/2024    CO2 25 06/11/2024     06/11/2024    BUN 14 06/11/2024    CREATININE 0.92 06/11/2024           _____________________________________________________  PHYSICAL EXAMINATION:  Vital signs: /86   Pulse 66   Ht 6' (1.829 m)   Wt 104 kg (230 lb)   BMI 31.19 kg/m²   General: well developed and well nourished, alert, oriented times 3, and appears comfortable  Psychiatric: Normal  HEENT: Trachea Midline, No torticollis  Cardiovascular: No discernable arrhythmia  Pulmonary: No wheezing or stridor  Abdomen: No rebound or guarding  Extremities: No peripheral edema  Skin: No masses, erythema, lacerations, fluctation, ulcerations  Neurovascular: Sensation Intact to the Median, Ulnar, Radial Nerve, Motor Intact to the Median, Ulnar, Radial Nerve, and Pulses Intact    MUSCULOSKELETAL EXAMINATION:  RIGHT SIDE:  Finger:  No tenderness and Full ROM    _____________________________________________________  STUDIES " REVIEWED:  No Studies to review      PROCEDURES PERFORMED:  Procedures  No Procedures performed today

## 2024-12-27 ENCOUNTER — APPOINTMENT (EMERGENCY)
Dept: RADIOLOGY | Facility: HOSPITAL | Age: 24
End: 2024-12-27
Payer: COMMERCIAL

## 2024-12-27 ENCOUNTER — HOSPITAL ENCOUNTER (EMERGENCY)
Facility: HOSPITAL | Age: 24
Discharge: HOME | End: 2024-12-27
Attending: EMERGENCY MEDICINE
Payer: COMMERCIAL

## 2024-12-27 VITALS
HEART RATE: 65 BPM | DIASTOLIC BLOOD PRESSURE: 71 MMHG | OXYGEN SATURATION: 97 % | RESPIRATION RATE: 24 BRPM | TEMPERATURE: 99.3 F | SYSTOLIC BLOOD PRESSURE: 118 MMHG

## 2024-12-27 DIAGNOSIS — M25.561 ACUTE PAIN OF RIGHT KNEE: Primary | ICD-10-CM

## 2024-12-27 PROCEDURE — 73564 X-RAY EXAM KNEE 4 OR MORE: CPT

## 2024-12-27 PROCEDURE — 99284 EMERGENCY DEPT VISIT MOD MDM: CPT | Performed by: EMERGENCY MEDICINE

## 2024-12-27 PROCEDURE — 99283 EMERGENCY DEPT VISIT LOW MDM: CPT

## 2024-12-27 RX ORDER — IBUPROFEN 400 MG/1
400 TABLET, FILM COATED ORAL ONCE
Status: COMPLETED | OUTPATIENT
Start: 2024-12-27 | End: 2024-12-27

## 2024-12-27 RX ADMIN — IBUPROFEN 400 MG: 400 TABLET, FILM COATED ORAL at 21:55

## 2024-12-27 NOTE — Clinical Note
Trey Barrientos was seen and treated in our emergency department on 12/27/2024.                Diagnosis: Right knee pain.    Trey  .    He may return on this date: 01/02/2025    Please allow for limited duty until patient is seen by orthopedics.     If you have any questions or concerns, please don't hesitate to call.      Nicholas Elliott MD    ______________________________           _______________          _______________  Hospital Representative                              Date                                Time

## 2024-12-27 NOTE — Clinical Note
Trey Barrientos was seen and treated in our emergency department on 12/27/2024.                Diagnosis: Right knee pain.    Trey  .    He may return on this date: 12/30/2024    Please allow for limited duty until patient is seen by orthopedics.     If you have any questions or concerns, please don't hesitate to call.      Nicholas Elliott MD    ______________________________           _______________          _______________  Hospital Representative                              Date                                Time

## 2024-12-28 NOTE — ED PROVIDER NOTES
Time reflects when diagnosis was documented in both MDM as applicable and the Disposition within this note       Time User Action Codes Description Comment    12/27/2024 10:27 PM Nicholas Elliott Add [M25.561] Acute pain of right knee           ED Disposition       ED Disposition   Discharge    Condition   Stable    Date/Time   Fri Dec 27, 2024 10:28 PM    Comment   Trey Barrientos discharge to home/self care.                   Assessment & Plan       Medical Decision Making  24-year-old male presenting to the ED today with right ear pain.  Differential includes patellar fracture versus tibial plateau fracture versus ligamentous sprain/tear.  Will evaluate this time with an x-ray of the right knee.  Discussed with patient that if there is no fractures we will place him in a knee immobilizer as well as given crutches as there is still may be ligamentous injury.  He will need evaluation by orthopedic surgery as an outpatient.    X-ray was otherwise unremarkable.  Placed in knee immobilizer and crutches by nursing staff.  Encouraged outpatient follow-up.  Encouraged continuing Tylenol and ibuprofen for pain.  Ambulatory referral placed for orthopedics and he was given information to call as well.  Return precautions discussed.    Amount and/or Complexity of Data Reviewed  Radiology: ordered.    Risk  Prescription drug management.             Medications   ibuprofen (MOTRIN) tablet 400 mg (400 mg Oral Given 12/27/24 2155)       ED Risk Strat Scores                                              History of Present Illness       Chief Complaint   Patient presents with    Knee Injury     Injured R knee while snowboarding       Past Medical History:   Diagnosis Date    Anesthesia complication     Sister had difficulty waking up    Fracture     of radius and ulna-reduced with out surgery-still bothers him    Shoulder pain, left     Wears glasses       Past Surgical History:   Procedure Laterality Date    FL INJECTION LEFT SHOULDER  (ARTHROGRAM)  2/25/2020    NO PAST SURGERIES      CT SURGICAL ARTHROSCOPY SHOULDER CAPSULORRHAPHY Left 8/23/2018    Procedure: SHOULDER ARTHROSCOPY POSTERIOR LABRAL REPAIR;  Surgeon: Stephan Ramirez MD;  Location: [facility];  Service: Orthopedics      Family History   Problem Relation Age of Onset    No Known Problems Mother     No Known Problems Father     No Known Problems Sister     No Known Problems Brother     No Known Problems Maternal Aunt     No Known Problems Maternal Uncle     No Known Problems Paternal Aunt     No Known Problems Paternal Uncle     No Known Problems Maternal Grandmother     No Known Problems Maternal Grandfather     No Known Problems Paternal Grandmother     No Known Problems Paternal Grandfather     ADD / ADHD Neg Hx     Anesthesia problems Neg Hx     Cancer Neg Hx     Clotting disorder Neg Hx     Collagen disease Neg Hx     Diabetes Neg Hx     Dislocations Neg Hx     Learning disabilities Neg Hx     Neurological problems Neg Hx     Osteoporosis Neg Hx     Rheumatologic disease Neg Hx     Scoliosis Neg Hx     Vascular Disease Neg Hx       Social History     Tobacco Use    Smoking status: Some Days     Types: E-Cigarettes    Smokeless tobacco: Current     Types: Chew    Tobacco comments:     social smoker   Vaping Use    Vaping status: Some Days   Substance Use Topics    Alcohol use: No     Comment: socially    Drug use: No      E-Cigarette/Vaping    E-Cigarette Use Current Some Day User       E-Cigarette/Vaping Substances    Nicotine No     THC No     CBD No     Flavoring No     Other No     Unknown No       I have reviewed and agree with the history as documented.     24-year-old male presenting to the ED today with right knee pain.  Patient was snowboarding.  Went to go to maneuver to avoid something.  Caused lots of twisting motions in his right knee.  Also then fell.  Happened a few hours ago.        Review of Systems   Constitutional:  Negative for chills and fever.   HENT:   Negative for hearing loss.    Eyes:  Negative for visual disturbance.   Respiratory:  Negative for shortness of breath.    Cardiovascular:  Negative for chest pain.   Gastrointestinal:  Negative for abdominal pain, constipation, diarrhea, nausea and vomiting.   Genitourinary:  Negative for difficulty urinating.   Musculoskeletal:  Negative for myalgias.   Skin:  Negative for rash.   Neurological:  Negative for dizziness.   Psychiatric/Behavioral:  Negative for agitation.    All other systems reviewed and are negative.          Objective       ED Triage Vitals [12/27/24 2141]   Temperature Pulse Blood Pressure Respirations SpO2 Patient Position - Orthostatic VS   99.3 °F (37.4 °C) 65 118/71 (!) 24 97 % Sitting      Temp Source Heart Rate Source BP Location FiO2 (%) Pain Score    Tympanic Monitor Left arm -- 10 - Worst Possible Pain      Vitals      Date and Time Temp Pulse SpO2 Resp BP Pain Score FACES Pain Rating User   12/27/24 2225 -- -- -- -- -- 10 - Worst Possible Pain -- SF   12/27/24 2155 -- -- -- -- -- 10 - Worst Possible Pain --    12/27/24 2141 99.3 °F (37.4 °C) 65 97 % 24 118/71 10 - Worst Possible Pain -- LS            Physical Exam  Vitals and nursing note reviewed.   Constitutional:       General: He is not in acute distress.     Appearance: Normal appearance. He is not ill-appearing.   HENT:      Head: Normocephalic and atraumatic.      Right Ear: External ear normal.      Left Ear: External ear normal.      Nose: Nose normal. No congestion.      Mouth/Throat:      Mouth: Mucous membranes are moist.      Pharynx: Oropharynx is clear. No oropharyngeal exudate.   Eyes:      General:         Right eye: No discharge.         Left eye: No discharge.      Extraocular Movements: Extraocular movements intact.      Conjunctiva/sclera: Conjunctivae normal.      Pupils: Pupils are equal, round, and reactive to light.   Cardiovascular:      Rate and Rhythm: Normal rate and regular rhythm.      Pulses: Normal  pulses.      Heart sounds: Normal heart sounds.   Pulmonary:      Effort: Pulmonary effort is normal. No respiratory distress.      Breath sounds: Normal breath sounds. No wheezing.   Abdominal:      General: Abdomen is flat. Bowel sounds are normal. There is no distension.      Palpations: Abdomen is soft.      Tenderness: There is no abdominal tenderness.   Musculoskeletal:      Cervical back: Normal range of motion. No rigidity.      Comments: Swelling noted to the right anterior knee.  He has tenderness palpation mostly on the medial aspect of his right knee but also over the tibial tuberosity.   Skin:     General: Skin is warm and dry.      Capillary Refill: Capillary refill takes less than 2 seconds.   Neurological:      General: No focal deficit present.      Mental Status: He is alert and oriented to person, place, and time. Mental status is at baseline.      Cranial Nerves: No cranial nerve deficit.      Motor: No weakness.      Gait: Gait normal.   Psychiatric:         Mood and Affect: Mood normal.         Behavior: Behavior normal.         Results Reviewed       None            XR knee 4+ vw right injury    (Results Pending)       Procedures    ED Medication and Procedure Management   Prior to Admission Medications   Prescriptions Last Dose Informant Patient Reported? Taking?   naproxen (EC NAPROSYN) 500 MG EC tablet   No No   Sig: Take 1 tablet (500 mg total) by mouth 2 (two) times a day with meals for 7 days   pantoprazole (PROTONIX) 40 mg tablet   No No   Sig: Take 1 tablet (40 mg total) by mouth daily in the early morning for 7 days      Facility-Administered Medications: None     Discharge Medication List as of 12/27/2024 10:29 PM        CONTINUE these medications which have NOT CHANGED    Details   naproxen (EC NAPROSYN) 500 MG EC tablet Take 1 tablet (500 mg total) by mouth 2 (two) times a day with meals for 7 days, Starting Tue 6/11/2024, Until Tue 6/18/2024, Normal      pantoprazole (PROTONIX) 40  mg tablet Take 1 tablet (40 mg total) by mouth daily in the early morning for 7 days, Starting Wed 6/12/2024, Until Wed 6/19/2024, Normal             ED SEPSIS DOCUMENTATION   Time reflects when diagnosis was documented in both MDM as applicable and the Disposition within this note       Time User Action Codes Description Comment    12/27/2024 10:27 PM Nicholas Elliott Add [M25.561] Acute pain of right knee                  Nicholas Elliott MD  12/27/24 4467

## 2024-12-28 NOTE — DISCHARGE INSTRUCTIONS
Your x-ray at this time did not show any signs of fracture.  As we discussed you may still have ligamentous injury however so I am placing in a knee immobilizer and you should use the crutches while ambulating.  I placed a referral for orthopedic surgery but you can call their office below to make an appointment for follow-up.  Please continue 600 mg of ibuprofen and 975 mg of Tylenol every 6 hours for pain.  You can also ice it while at home.  Please keep the leg elevated while you are sitting at home as well.    Return to ER if you develop skin color changes.

## 2025-07-08 ENCOUNTER — TELEPHONE (OUTPATIENT)
Age: 25
End: 2025-07-08

## 2025-07-08 NOTE — TELEPHONE ENCOUNTER
Patient has moved to [facility] and established care with out-of-network PCP.    Please remove Dr. Cisneros as PCP.

## 2025-08-12 ENCOUNTER — DOCUMENTATION (OUTPATIENT)
Dept: ADMINISTRATIVE | Facility: OTHER | Age: 25
End: 2025-08-12

## (undated) DEVICE — GLOVE INDICATOR PI UNDERGLOVE SZ 7.5 BLUE

## (undated) DEVICE — CHLORAPREP HI-LITE 26ML ORANGE

## (undated) DEVICE — 3M™ TEGADERM™ TRANSPARENT FILM DRESSING FRAME STYLE, 1626W, 4 IN X 4-3/4 IN (10 CM X 12 CM), 50/CT 4CT/CASE: Brand: 3M™ TEGADERM™

## (undated) DEVICE — SUTURETAPE 1.3MM W/NEEDLE WHITE/BLUE

## (undated) DEVICE — Device

## (undated) DEVICE — PACK ARTHROSCOPY

## (undated) DEVICE — TIBURON BEACH CHAIR SHOULDER DRAPE: Brand: CONVERTORS

## (undated) DEVICE — SCD SEQUENTIAL COMPRESSION COMFORT SLEEVE MEDIUM KNEE LENGTH: Brand: KENDALL SCD

## (undated) DEVICE — GLOVE SRG BIOGEL 7.5

## (undated) DEVICE — OCCLUSIVE GAUZE STRIP,3% BISMUTH TRIBROMOPHENATE IN PETROLATUM BLEND: Brand: XEROFORM

## (undated) DEVICE — TUBING SUCTION 5MM X 12 FT

## (undated) DEVICE — GLOVE SRG BIOGEL 6.5

## (undated) DEVICE — SYRINGE 50ML LL

## (undated) DEVICE — KIT DISP F/ 2.9MM ROTATOR CUFF PUSHLOCK

## (undated) DEVICE — ASTOUND IMPERVIOUS SURGICAL GOWN: Brand: CONVERTORS

## (undated) DEVICE — TUBING ARTHROSCOPIC WAVE  MAIN PUMP

## (undated) DEVICE — BLADE SHAVER EXCALIBUR 4MM 13CM COOLCUT

## (undated) DEVICE — GAUZE SPONGES,16 PLY: Brand: CURITY

## (undated) DEVICE — CANNULA 7 X70MM THRD SEAL SIDE PORT

## (undated) DEVICE — DUAL SPIKE ADAPTER: Brand: CONMED

## (undated) DEVICE — BURR  OVAL 4MM 13CM 8 FLUTE COOLCUT

## (undated) DEVICE — POSITIONER TRIMANO LIMB BEACH CHAIR

## (undated) DEVICE — GLOVE INDICATOR PI UNDERGLOVE SZ 6.5 BLUE

## (undated) DEVICE — CANNULA 5.75 X 70MM BARREL SHAPED BOWL

## (undated) DEVICE — LABEL MEDICATION STERILE 2 YELLOW LIDOCAINE 2 BLUE MARCAINE 2 ORANGE HEPARIN

## (undated) DEVICE — INTENDED FOR TISSUE SEPARATION, AND OTHER PROCEDURES THAT REQUIRE A SHARP SURGICAL BLADE TO PUNCTURE OR CUT.: Brand: BARD-PARKER SAFETY BLADES SIZE 11, STERILE